# Patient Record
Sex: FEMALE | Race: WHITE | HISPANIC OR LATINO | URBAN - METROPOLITAN AREA
[De-identification: names, ages, dates, MRNs, and addresses within clinical notes are randomized per-mention and may not be internally consistent; named-entity substitution may affect disease eponyms.]

---

## 2017-10-27 ENCOUNTER — LAB VISIT (OUTPATIENT)
Dept: LAB | Facility: HOSPITAL | Age: 47
End: 2017-10-27
Attending: FAMILY MEDICINE
Payer: COMMERCIAL

## 2017-10-27 ENCOUNTER — OFFICE VISIT (OUTPATIENT)
Dept: FAMILY MEDICINE | Facility: CLINIC | Age: 47
End: 2017-10-27
Attending: FAMILY MEDICINE
Payer: COMMERCIAL

## 2017-10-27 ENCOUNTER — PATIENT MESSAGE (OUTPATIENT)
Dept: FAMILY MEDICINE | Facility: CLINIC | Age: 47
End: 2017-10-27

## 2017-10-27 VITALS
DIASTOLIC BLOOD PRESSURE: 80 MMHG | BODY MASS INDEX: 28.02 KG/M2 | SYSTOLIC BLOOD PRESSURE: 120 MMHG | HEART RATE: 71 BPM | HEIGHT: 65 IN | OXYGEN SATURATION: 98 % | WEIGHT: 168.19 LBS | RESPIRATION RATE: 16 BRPM

## 2017-10-27 DIAGNOSIS — R00.2 PALPITATIONS: ICD-10-CM

## 2017-10-27 DIAGNOSIS — Z00.00 ROUTINE GENERAL MEDICAL EXAMINATION AT A HEALTH CARE FACILITY: Primary | ICD-10-CM

## 2017-10-27 DIAGNOSIS — N95.1 SYMPTOMATIC MENOPAUSAL OR FEMALE CLIMACTERIC STATES: ICD-10-CM

## 2017-10-27 DIAGNOSIS — Z98.890 STATUS POST ENDOMETRIAL ABLATION: ICD-10-CM

## 2017-10-27 DIAGNOSIS — Z00.00 LABORATORY EXAM ORDERED AS PART OF ROUTINE GENERAL MEDICAL EXAMINATION: ICD-10-CM

## 2017-10-27 DIAGNOSIS — Z91.89 FRAMINGHAM CARDIAC RISK <10% IN NEXT 10 YEARS: ICD-10-CM

## 2017-10-27 LAB
ALBUMIN SERPL BCP-MCNC: 3.8 G/DL
ALP SERPL-CCNC: 54 U/L
ALT SERPL W/O P-5'-P-CCNC: 25 U/L
ANION GAP SERPL CALC-SCNC: 11 MMOL/L
AST SERPL-CCNC: 27 U/L
BASOPHILS # BLD AUTO: 0.05 K/UL
BASOPHILS NFR BLD: 1 %
BILIRUB SERPL-MCNC: 0.4 MG/DL
BUN SERPL-MCNC: 15 MG/DL
CALCIUM SERPL-MCNC: 10.2 MG/DL
CHLORIDE SERPL-SCNC: 104 MMOL/L
CHOLEST SERPL-MCNC: 215 MG/DL
CHOLEST/HDLC SERPL: 3.7 {RATIO}
CO2 SERPL-SCNC: 26 MMOL/L
CREAT SERPL-MCNC: 0.8 MG/DL
DIFFERENTIAL METHOD: NORMAL
EOSINOPHIL # BLD AUTO: 0.1 K/UL
EOSINOPHIL NFR BLD: 2.6 %
ERYTHROCYTE [DISTWIDTH] IN BLOOD BY AUTOMATED COUNT: 13 %
EST. GFR  (AFRICAN AMERICAN): >60 ML/MIN/1.73 M^2
EST. GFR  (NON AFRICAN AMERICAN): >60 ML/MIN/1.73 M^2
FSH SERPL-ACNC: 76.7 MIU/ML
GLUCOSE SERPL-MCNC: 79 MG/DL
HCT VFR BLD AUTO: 39.4 %
HDLC SERPL-MCNC: 58 MG/DL
HDLC SERPL: 27 %
HGB BLD-MCNC: 12.8 G/DL
IMM GRANULOCYTES # BLD AUTO: 0.01 K/UL
IMM GRANULOCYTES NFR BLD AUTO: 0.2 %
LDLC SERPL CALC-MCNC: 137.4 MG/DL
LH SERPL-ACNC: 28.1 MIU/ML
LYMPHOCYTES # BLD AUTO: 1.6 K/UL
LYMPHOCYTES NFR BLD: 31.5 %
MCH RBC QN AUTO: 28.3 PG
MCHC RBC AUTO-ENTMCNC: 32.5 G/DL
MCV RBC AUTO: 87 FL
MONOCYTES # BLD AUTO: 0.4 K/UL
MONOCYTES NFR BLD: 8.1 %
NEUTROPHILS # BLD AUTO: 2.9 K/UL
NEUTROPHILS NFR BLD: 56.6 %
NONHDLC SERPL-MCNC: 157 MG/DL
NRBC BLD-RTO: 0 /100 WBC
PLATELET # BLD AUTO: 219 K/UL
PMV BLD AUTO: 10.6 FL
POTASSIUM SERPL-SCNC: 4.2 MMOL/L
PROT SERPL-MCNC: 8 G/DL
RBC # BLD AUTO: 4.53 M/UL
SODIUM SERPL-SCNC: 141 MMOL/L
TRIGL SERPL-MCNC: 98 MG/DL
TSH SERPL DL<=0.005 MIU/L-ACNC: 0.92 UIU/ML
WBC # BLD AUTO: 5.08 K/UL

## 2017-10-27 PROCEDURE — 85025 COMPLETE CBC W/AUTO DIFF WBC: CPT

## 2017-10-27 PROCEDURE — 99386 PREV VISIT NEW AGE 40-64: CPT | Mod: 25,S$GLB,, | Performed by: FAMILY MEDICINE

## 2017-10-27 PROCEDURE — 36415 COLL VENOUS BLD VENIPUNCTURE: CPT | Mod: PO

## 2017-10-27 PROCEDURE — 80053 COMPREHEN METABOLIC PANEL: CPT

## 2017-10-27 PROCEDURE — 90471 IMMUNIZATION ADMIN: CPT | Mod: S$GLB,,, | Performed by: FAMILY MEDICINE

## 2017-10-27 PROCEDURE — 90715 TDAP VACCINE 7 YRS/> IM: CPT | Mod: S$GLB,,, | Performed by: FAMILY MEDICINE

## 2017-10-27 PROCEDURE — 83002 ASSAY OF GONADOTROPIN (LH): CPT

## 2017-10-27 PROCEDURE — 84443 ASSAY THYROID STIM HORMONE: CPT

## 2017-10-27 PROCEDURE — 80061 LIPID PANEL: CPT

## 2017-10-27 PROCEDURE — 99999 PR PBB SHADOW E&M-NEW PATIENT-LVL III: CPT | Mod: PBBFAC,,, | Performed by: FAMILY MEDICINE

## 2017-10-27 PROCEDURE — 83001 ASSAY OF GONADOTROPIN (FSH): CPT

## 2017-10-27 RX ORDER — MUPIROCIN 20 MG/G
OINTMENT TOPICAL 2 TIMES DAILY
COMMUNITY
Start: 2017-10-11 | End: 2022-11-09

## 2017-10-27 NOTE — PROGRESS NOTES
Subjective:       Patient ID: Kelly Butterfield is a 47 y.o. female.    Chief Complaint: Annual Exam    HPI     The patient presents to the office today requesting a routine periodic health examination.  This is her first visit with me in many years.  Patient Active Problem List   Diagnosis    Status post endometrial ablation       Past Surgical History:   Procedure Laterality Date    ENDOMETRIAL ABLATION      KNEE ARTHROSCOPY W/ MENISCECTOMY  2017         Current Outpatient Prescriptions:     mupirocin (BACTROBAN) 2 % ointment, Apply topically 2 (two) times daily. As directed, Disp: , Rfl:     Review of patient's allergies indicates:  No Known Allergies    Family History   Problem Relation Age of Onset    Heart disease Mother     Heart disease Father     No Known Problems Brother     No Known Problems Brother        Social History     Social History    Marital status:      Spouse name: N/A    Number of children: 2    Years of education: N/A     Occupational History    Health Care       [employer]     Social History Main Topics    Smoking status: Former Smoker     Packs/day: 1.00     Years: 13.00     Types: Cigarettes     Start date: 10/27/1988     Quit date: 10/27/2001    Smokeless tobacco: Not on file    Alcohol use 0.0 oz/week      Comment: 2-3x/year    Drug use: No    Sexual activity: Yes     Other Topics Concern    Not on file     Social History Narrative    The patient does exercise regularly (jogs/walks, volleyball).      She is not satisfied with weight.    Rates diet as fair.    She does not drink at least 1/2 gallon water daily.    She drinks 1 coffee/tea/caffeine-containing soft drinks daily.    Total sleep time at night is 6-7 hours.    She works 60 hours per week.    She does wear seat belts.    Hobbies include none.           OB History      Para Term  AB Living    3 2 2   1      SAB TAB Ectopic Multiple Live Births    1                Obstetric  Comments    Menarche age 11.   Menses absent since ablation.  First child born age 32.  History of abnormal PAP smear: YES: ASCUS; subsequent PAPs normal.  History of abnormal mammogram: YES: lump, not removed.  History of sexually transmitted disease:  NO            Patient Care Team:  Juventino Wilkins Jr., MD as PCP - General (Family Medicine)  Salinas Romero Jr., MD as Obstetrician (Obstetrics)  Julian Martines MD as Consulting Physician (Orthopedic Surgery)      Review of Systems   Constitutional: Negative for activity change and unexpected weight change.   HENT: Negative for hearing loss, rhinorrhea and trouble swallowing.    Eyes: Negative for discharge and visual disturbance.   Respiratory: Positive for chest tightness. Negative for wheezing.    Cardiovascular: Positive for palpitations. Negative for chest pain.   Gastrointestinal: Negative for blood in stool, constipation, diarrhea and vomiting.        Occ GERD, 2/t certain foods   Endocrine: Negative for polydipsia and polyuria.   Genitourinary: Negative for difficulty urinating, dysuria, hematuria and menstrual problem.   Musculoskeletal: Negative for arthralgias, joint swelling and neck pain.   Neurological: Negative for weakness and headaches.   Psychiatric/Behavioral: Negative for confusion, dysphoric mood and sleep disturbance.       Objective:      Physical Exam      Assessment:       1. Routine general medical examination at a health care facility    2. Palpitations    3. Status post endometrial ablation    4. Laboratory exam ordered as part of routine general medical examination          Plan:       Laboratory investigation, including diabetes and thyroid screening, serum chemistries, and lipid profile.  Discussed with patient the importance of lifestyle modifications, including well-balanced diet and moderate exercise regimen, in reducing risk for cardiovascular/cerebrovascular disease and diabetes.  Discussed routine examinations and  "screenings.   Will obtain records from Dr. Romero. Update Health Maintenance at that time.   Flu/TdaP today.  We will call the patient with results & make further recommendations at that time.        "This note will not be shared with the patient."  "

## 2017-10-27 NOTE — PATIENT INSTRUCTIONS
Your test results will be communicated to you via : My [facility], Telephone or Letter.   If you have not received your test results in one week, please contact the clinic at [phone].

## 2018-06-25 DIAGNOSIS — Z12.39 BREAST CANCER SCREENING: ICD-10-CM

## 2018-08-03 DIAGNOSIS — Z12.11 COLON CANCER SCREENING: ICD-10-CM

## 2018-12-21 ENCOUNTER — LAB VISIT (OUTPATIENT)
Dept: LAB | Facility: HOSPITAL | Age: 48
End: 2018-12-21
Attending: FAMILY MEDICINE
Payer: COMMERCIAL

## 2018-12-21 DIAGNOSIS — Z12.11 COLON CANCER SCREENING: ICD-10-CM

## 2018-12-21 LAB — HEMOCCULT STL QL IA: NEGATIVE

## 2018-12-21 PROCEDURE — 82274 ASSAY TEST FOR BLOOD FECAL: CPT

## 2018-12-31 ENCOUNTER — PATIENT OUTREACH (OUTPATIENT)
Dept: ADMINISTRATIVE | Facility: HOSPITAL | Age: 48
End: 2018-12-31

## 2018-12-31 ENCOUNTER — PATIENT MESSAGE (OUTPATIENT)
Dept: ADMINISTRATIVE | Facility: HOSPITAL | Age: 48
End: 2018-12-31

## 2019-01-30 ENCOUNTER — PATIENT OUTREACH (OUTPATIENT)
Dept: ADMINISTRATIVE | Facility: HOSPITAL | Age: 49
End: 2019-01-30

## 2019-01-30 NOTE — PROGRESS NOTES
SPOKE WITH PATIENT REGARDING SCHEDULING OF PAP SMEAR AND MAMMOGRAM    PATIENT HAS NOT COMPLETED AT AN OUTSIDE FACILITY    DECLINE TO SCHEDULE DURING CALL WILL CALL TO SCHEDULE OR SCHEDULE THROUGH PORTAL    UNDERSTANDING VERBALIZED.

## 2019-04-26 ENCOUNTER — PATIENT MESSAGE (OUTPATIENT)
Dept: ADMINISTRATIVE | Facility: HOSPITAL | Age: 49
End: 2019-04-26

## 2019-04-30 ENCOUNTER — PATIENT MESSAGE (OUTPATIENT)
Dept: FAMILY MEDICINE | Facility: CLINIC | Age: 49
End: 2019-04-30

## 2019-05-01 ENCOUNTER — PATIENT MESSAGE (OUTPATIENT)
Dept: FAMILY MEDICINE | Facility: CLINIC | Age: 49
End: 2019-05-01

## 2020-03-02 ENCOUNTER — PATIENT MESSAGE (OUTPATIENT)
Dept: FAMILY MEDICINE | Facility: CLINIC | Age: 50
End: 2020-03-02

## 2020-03-02 DIAGNOSIS — Z12.11 COLON CANCER SCREENING: Primary | ICD-10-CM

## 2020-03-03 ENCOUNTER — PATIENT MESSAGE (OUTPATIENT)
Dept: FAMILY MEDICINE | Facility: CLINIC | Age: 50
End: 2020-03-03

## 2020-03-03 DIAGNOSIS — Z12.11 COLON CANCER SCREENING: Primary | ICD-10-CM

## 2020-06-16 ENCOUNTER — PATIENT MESSAGE (OUTPATIENT)
Dept: FAMILY MEDICINE | Facility: CLINIC | Age: 50
End: 2020-06-16

## 2020-06-22 ENCOUNTER — TELEPHONE (OUTPATIENT)
Dept: GASTROENTEROLOGY | Facility: CLINIC | Age: 50
End: 2020-06-22

## 2020-06-22 ENCOUNTER — PATIENT MESSAGE (OUTPATIENT)
Dept: GASTROENTEROLOGY | Facility: CLINIC | Age: 50
End: 2020-06-22

## 2020-08-10 DIAGNOSIS — Z01.812 PRE-PROCEDURE LAB EXAM: ICD-10-CM

## 2020-08-14 ENCOUNTER — LAB VISIT (OUTPATIENT)
Dept: FAMILY MEDICINE | Facility: CLINIC | Age: 50
End: 2020-08-14
Payer: COMMERCIAL

## 2020-08-14 ENCOUNTER — TELEPHONE (OUTPATIENT)
Dept: SURGERY | Facility: HOSPITAL | Age: 50
End: 2020-08-14

## 2020-08-14 ENCOUNTER — TELEPHONE (OUTPATIENT)
Dept: GASTROENTEROLOGY | Facility: CLINIC | Age: 50
End: 2020-08-14

## 2020-08-14 DIAGNOSIS — Z01.812 PRE-PROCEDURE LAB EXAM: ICD-10-CM

## 2020-08-14 PROCEDURE — U0003 INFECTIOUS AGENT DETECTION BY NUCLEIC ACID (DNA OR RNA); SEVERE ACUTE RESPIRATORY SYNDROME CORONAVIRUS 2 (SARS-COV-2) (CORONAVIRUS DISEASE [COVID-19]), AMPLIFIED PROBE TECHNIQUE, MAKING USE OF HIGH THROUGHPUT TECHNOLOGIES AS DESCRIBED BY CMS-2020-01-R: HCPCS

## 2020-08-14 RX ORDER — PROGESTERONE 200 MG/1
200 CAPSULE ORAL NIGHTLY
COMMUNITY
End: 2022-11-09

## 2020-08-14 NOTE — TELEPHONE ENCOUNTER
Spoke with pt. Informed [facility] is making calls to give arrival time & the only patients who are given early times guaranteed are diabetics. Pt verbalized understanding.

## 2020-08-14 NOTE — TELEPHONE ENCOUNTER
Called patient for preop call today and was not happy with procedure time. All other patients for the day have been contacted. Unable to adjust procedure time. Patient wants to reschedule and be scheduled as first or second case. Please contact patient to reschedule.

## 2020-08-14 NOTE — TELEPHONE ENCOUNTER
----- Message from Kendy Palacios sent at 8/14/2020 12:34 PM CDT -----  Regarding: reschedule appt  Contact: pt [phone]  Pt is calling to reschedule her procedure to a earlier time. Please contact pt [phone]

## 2020-08-15 LAB — SARS-COV-2 RNA RESP QL NAA+PROBE: NOT DETECTED

## 2020-08-17 ENCOUNTER — ANESTHESIA EVENT (OUTPATIENT)
Dept: ENDOSCOPY | Facility: HOSPITAL | Age: 50
End: 2020-08-17
Payer: COMMERCIAL

## 2020-08-17 ENCOUNTER — ANESTHESIA (OUTPATIENT)
Dept: ENDOSCOPY | Facility: HOSPITAL | Age: 50
End: 2020-08-17
Payer: COMMERCIAL

## 2020-08-17 ENCOUNTER — HOSPITAL ENCOUNTER (OUTPATIENT)
Facility: HOSPITAL | Age: 50
Discharge: HOME | End: 2020-08-17
Attending: INTERNAL MEDICINE | Admitting: INTERNAL MEDICINE
Payer: COMMERCIAL

## 2020-08-17 VITALS
TEMPERATURE: 98 F | WEIGHT: 160 LBS | SYSTOLIC BLOOD PRESSURE: 120 MMHG | OXYGEN SATURATION: 98 % | BODY MASS INDEX: 26.66 KG/M2 | HEIGHT: 65 IN | RESPIRATION RATE: 16 BRPM | DIASTOLIC BLOOD PRESSURE: 69 MMHG | HEART RATE: 76 BPM

## 2020-08-17 DIAGNOSIS — Z12.11 SCREEN FOR COLON CANCER: ICD-10-CM

## 2020-08-17 PROCEDURE — G0121 COLON CA SCRN NOT HI RSK IND: HCPCS | Mod: PO | Performed by: INTERNAL MEDICINE

## 2020-08-17 PROCEDURE — D9220A PRA ANESTHESIA: ICD-10-PCS | Mod: CRNA,,, | Performed by: NURSE ANESTHETIST, CERTIFIED REGISTERED

## 2020-08-17 PROCEDURE — D9220A PRA ANESTHESIA: ICD-10-PCS | Mod: ANES,,, | Performed by: ANESTHESIOLOGY

## 2020-08-17 PROCEDURE — 63600175 PHARM REV CODE 636 W HCPCS: Mod: PO | Performed by: INTERNAL MEDICINE

## 2020-08-17 PROCEDURE — 37000009 HC ANESTHESIA EA ADD 15 MINS: Mod: PO | Performed by: INTERNAL MEDICINE

## 2020-08-17 PROCEDURE — 25000003 PHARM REV CODE 250: Mod: PO | Performed by: NURSE ANESTHETIST, CERTIFIED REGISTERED

## 2020-08-17 PROCEDURE — G0121 COLON CA SCRN NOT HI RSK IND: ICD-10-PCS | Mod: ,,, | Performed by: INTERNAL MEDICINE

## 2020-08-17 PROCEDURE — 63600175 PHARM REV CODE 636 W HCPCS: Mod: PO | Performed by: NURSE ANESTHETIST, CERTIFIED REGISTERED

## 2020-08-17 PROCEDURE — G0121 COLON CA SCRN NOT HI RSK IND: HCPCS | Mod: ,,, | Performed by: INTERNAL MEDICINE

## 2020-08-17 PROCEDURE — D9220A PRA ANESTHESIA: Mod: ANES,,, | Performed by: ANESTHESIOLOGY

## 2020-08-17 PROCEDURE — 37000008 HC ANESTHESIA 1ST 15 MINUTES: Mod: PO | Performed by: INTERNAL MEDICINE

## 2020-08-17 PROCEDURE — D9220A PRA ANESTHESIA: Mod: CRNA,,, | Performed by: NURSE ANESTHETIST, CERTIFIED REGISTERED

## 2020-08-17 RX ORDER — PROPOFOL 10 MG/ML
VIAL (ML) INTRAVENOUS
Status: DISCONTINUED | OUTPATIENT
Start: 2020-08-17 | End: 2020-08-17

## 2020-08-17 RX ORDER — SODIUM CHLORIDE 0.9 % (FLUSH) 0.9 %
10 SYRINGE (ML) INJECTION
Status: DISCONTINUED | OUTPATIENT
Start: 2020-08-17 | End: 2020-08-17 | Stop reason: HOSPADM

## 2020-08-17 RX ORDER — SODIUM CHLORIDE, SODIUM LACTATE, POTASSIUM CHLORIDE, CALCIUM CHLORIDE 600; 310; 30; 20 MG/100ML; MG/100ML; MG/100ML; MG/100ML
INJECTION, SOLUTION INTRAVENOUS CONTINUOUS
Status: DISCONTINUED | OUTPATIENT
Start: 2020-08-17 | End: 2020-08-17 | Stop reason: HOSPADM

## 2020-08-17 RX ORDER — LIDOCAINE HCL/PF 100 MG/5ML
SYRINGE (ML) INTRAVENOUS
Status: DISCONTINUED | OUTPATIENT
Start: 2020-08-17 | End: 2020-08-17

## 2020-08-17 RX ADMIN — PROPOFOL 30 MG: 10 INJECTION, EMULSION INTRAVENOUS at 07:08

## 2020-08-17 RX ADMIN — LIDOCAINE HYDROCHLORIDE 100 MG: 20 INJECTION, SOLUTION INTRAVENOUS at 07:08

## 2020-08-17 RX ADMIN — SODIUM CHLORIDE, SODIUM LACTATE, POTASSIUM CHLORIDE, AND CALCIUM CHLORIDE: .6; .31; .03; .02 INJECTION, SOLUTION INTRAVENOUS at 07:08

## 2020-08-17 NOTE — DISCHARGE SUMMARY
Discharge Note  Short Stay      SUMMARY     Admit Date: 8/17/2020    Attending Physician: Yannick Min MD     Discharge Physician: Yannick Min MD    Discharge Date: 8/17/2020 7:55 AM    Final Diagnosis: Colon cancer screening [Z12.11]    Disposition: HOME OR SELF CARE    Patient Instructions:   Current Discharge Medication List      CONTINUE these medications which have NOT CHANGED    Details   progesterone (PROMETRIUM) 200 MG capsule Take 200 mg by mouth every evening.      mupirocin (BACTROBAN) 2 % ointment Apply topically 2 (two) times daily. As directed             Discharge Procedure Orders (must include Diet, Follow-up, Activity)    Follow Up:  Follow up with PCP as previously scheduled  Resume routine diet.  Activity as tolerated.    No driving day of procedure.

## 2020-08-17 NOTE — ANESTHESIA POSTPROCEDURE EVALUATION
Anesthesia Post Evaluation    Patient: Kelly Butterfield    Procedure(s) Performed: Procedure(s) (LRB):  COLONOSCOPY (N/A)    Final Anesthesia Type: general    Patient location during evaluation: PACU  Patient participation: Yes- Able to Participate  Level of consciousness: awake and alert and oriented  Post-procedure vital signs: reviewed and stable  Pain management: adequate  Airway patency: patent    PONV status at discharge: No PONV  Anesthetic complications: no      Cardiovascular status: blood pressure returned to baseline  Respiratory status: unassisted, spontaneous ventilation and room air  Hydration status: euvolemic  Follow-up not needed.          Vitals Value Taken Time   /69 08/17/20 0824   Temp 36.4 °C (97.5 °F) 08/17/20 0824   Pulse 76 08/17/20 0824   Resp 16 08/17/20 0824   SpO2 98 % 08/17/20 0824         No case tracking events are documented in the log.      Pain/Braden Score: Braden Score: 10 (8/17/2020  8:15 AM)

## 2020-08-17 NOTE — ANESTHESIA PREPROCEDURE EVALUATION
08/17/2020  Kelly Butterfield is a 50 y.o., female.    Anesthesia Evaluation          Review of Systems  Anesthesia Hx:  Hx of Anesthetic complications PONV   Social:  Non-Smoker   Cardiovascular:   Exercise tolerance: good    Pulmonary:  Pulmonary Normal    Renal/:  Renal/ Normal     Hepatic/GI:   Bowel Prep.    Neurological:   Seizures, well controlled Last seizure 40years ago   Endocrine:  Endocrine Normal    Psych:  Psychiatric Normal           Physical Exam  General:  Well nourished    Airway/Jaw/Neck:  Airway Findings: Mouth Opening: Normal Tongue: Normal  General Airway Assessment: Adult  Mallampati: I  TM Distance: Normal, at least 6 cm       Chest/Lungs:  Chest/Lungs Clear    Heart/Vascular:  Heart Findings: Normal            Anesthesia Plan  Type of Anesthesia, risks & benefits discussed:  Anesthesia Type:  general  Patient's Preference:   Intra-op Monitoring Plan: standard ASA monitors  Intra-op Monitoring Plan Comments:   Post Op Pain Control Plan: multimodal analgesia and IV/PO Opioids PRN  Post Op Pain Control Plan Comments:   Induction:   IV  Beta Blocker:  Patient is not currently on a Beta-Blocker (No further documentation required).       Informed Consent: Patient understands risks and agrees with Anesthesia plan.  Questions answered. Anesthesia consent signed with patient.  ASA Score: 1     Day of Surgery Review of History & Physical:    H&P update referred to the surgeon.     Anesthesia Plan Notes: I have personally evaluated the patient and discussed risk/benefits/alternatives of general anesthesia.        Ready For Surgery From Anesthesia Perspective.

## 2020-08-17 NOTE — [employer] PATIENT INSTRUCTIONS
Discharge Summary/Instructions after an Endoscopic Procedure  Patient Name: Kelyl Butterfield  Patient MRN: [id number]  Patient YOB: 1970 Monday, August 17, 2020  Yannick Min MD  RESTRICTIONS:  During your procedure today, you received medications for sedation.  These   medications may affect your judgment, balance and coordination.  Therefore,   for 24 hours, you have the following restrictions:   - DO NOT drive a car, operate machinery, make legal/financial decisions,   sign important papers or drink alcohol.    ACTIVITY:  Today: no heavy lifting, straining or running due to procedural   sedation/anesthesia.  The following day: return to full activity including work.  DIET:  Eat and drink normally unless instructed otherwise.     TREATMENT FOR COMMON SIDE EFFECTS:  - Mild abdominal pain, nausea, belching, bloating or excessive gas:  rest,   eat lightly and use a heating pad.  - Sore Throat: treat with throat lozenges and/or gargle with warm salt   water.  - Because air was used during the procedure, expelling large amounts of air   from your rectum or belching is normal.  - If a bowel prep was taken, you may not have a bowel movement for 1-3 days.    This is normal.  SYMPTOMS TO WATCH FOR AND REPORT TO YOUR PHYSICIAN:  1. Abdominal pain or bloating, other than gas cramps.  2. Chest pain.  3. Back pain.  4. Signs of infection such as: chills or fever occurring within 24 hours   after the procedure.  5. Rectal bleeding, which would show as bright red, maroon, or black stools.   (A tablespoon of blood from the rectum is not serious, especially if   hemorrhoids are present.)  6. Vomiting.  7. Weakness or dizziness.  GO DIRECTLY TO THE NEAREST EMERGENCY ROOM IF YOU HAVE ANY OF THE FOLLOWING:      Difficulty breathing              Chills and/or fever over 101 F   Persistent vomiting and/or vomiting blood   Severe abdominal pain   Severe chest pain   Black, tarry stools   Bleeding- more than one  tablespoon   Any other symptom or condition that you feel may need urgent attention  Your doctor recommends these additional instructions:  If any biopsies were taken, your doctors clinic will contact you in 1 to 2   weeks with any results.  Your physician has recommended a repeat colonoscopy in 10 years for   screening purposes.   You are being discharged to home.  For questions, problems or results please call your physician - Yannick Min MD at Work:  [phone].  EMERGENCY PHONE NUMBER: [phone], LAB RESULTS: [phone]  IF A COMPLICATION OR EMERGENCY SITUATION ARISES AND YOU ARE UNABLE TO REACH   YOUR PHYSICIAN - GO DIRECTLY TO THE EMERGENCY ROOM.  ___________________________________________  Nurse Signature  ___________________________________________  Patient/Designated Responsible Party Signature  Yannick Min MD  8/17/2020 7:54:14 AM  This report has been verified and signed electronically.  [employer]

## 2020-08-17 NOTE — TRANSFER OF CARE
"Anesthesia Transfer of Care Note    Patient: Kelly Butterfield    Procedure(s) Performed: Procedure(s) (LRB):  COLONOSCOPY (N/A)    Patient location: PACU    Anesthesia Type: general    Transport from OR: Transported from OR on room air with adequate spontaneous ventilation    Post pain: adequate analgesia    Post assessment: no apparent anesthetic complications and tolerated procedure well    Post vital signs: stable    Level of consciousness: awake and alert    Nausea/Vomiting: no nausea/vomiting    Complications: none    Transfer of care protocol was followed      Last vitals:   Visit Vitals  /75   Pulse 86   Temp 36.4 °C (97.5 °F) (Skin)   Resp 17   Ht 5' 5" (1.651 m)   Wt 72.6 kg (160 lb)   SpO2 100%   Breastfeeding No   BMI 26.63 kg/m²     "

## 2020-08-17 NOTE — H&P
History & Physical - Short Stay  Gastroenterology      SUBJECTIVE:     Procedure: Colonoscopy    Chief Complaint/Indication for Procedure: Screening    PTA Medications   Medication Sig    progesterone (PROMETRIUM) 200 MG capsule Take 200 mg by mouth every evening.    mupirocin (BACTROBAN) 2 % ointment Apply topically 2 (two) times daily. As directed       Review of patient's allergies indicates:  No Known Allergies     Past Medical History:   Diagnosis Date    PONV (postoperative nausea and vomiting)     Seizures      Past Surgical History:   Procedure Laterality Date    AUGMENTATION OF BREAST Bilateral 1998    BREAST CYST ASPIRATION Right     > 15 yrs ago    ENDOMETRIAL ABLATION  2006    KNEE ARTHROSCOPY W/ MENISCECTOMY Right 2017     Family History   Problem Relation Age of Onset    Heart disease Mother     Heart disease Father     No Known Problems Brother     No Known Problems Brother     Cancer Paternal Grandmother      Social History     Tobacco Use    Smoking status: Former Smoker     Packs/day: 1.00     Years: 13.00     Pack years: 13.00     Types: Cigarettes     Start date: 10/27/1988     Quit date: 10/27/2001     Years since quittin.8    Smokeless tobacco: Never Used   Substance Use Topics    Alcohol use: Yes     Alcohol/week: 0.0 standard drinks     Comment: 2-3x/year    Drug use: No         OBJECTIVE:     Vital Signs (Most Recent)  Temp: 97.5 °F (36.4 °C) (20 07)  Pulse: 86 (20 07)  Resp: 17 (20 07)  BP: 122/75 (20 0704)  SpO2: 100 % (20 07)    Physical Exam                                                        GENERAL:  Comfortable, in no acute distress.                                 HEENT EXAM:  Nonicteric.  No adenopathy.  Oropharynx is clear.               NECK:  Supple.                                                               LUNGS:  Clear.                                                               CARDIAC:  Regular rate  and rhythm.  S1, S2.  No murmur.                      ABDOMEN:  Soft, positive bowel sounds, nontender.  No hepatosplenomegaly or masses.  No rebound or guarding.                                             EXTREMITIES:  No edema.     MENTAL STATUS:  Normal, alert and oriented.      ASSESSMENT/PLAN:     Assessment: Colorectal cancer screening    Plan: Colonoscopy    Anesthesia Plan: General    ASA Grade: ASA 2 - Patient with mild systemic disease with no functional limitations    MALLAMPATI SCORE:  I (soft palate, uvula, fauces, and tonsillar pillars visible)     In my medical opinion and judgment, this medical or surgical procedure was not able to be safely postponed in accordance with [place] Department of Health, Healthcare Facility Notice #[id number]-007.

## 2020-08-17 NOTE — DISCHARGE INSTRUCTIONS

## 2020-10-05 ENCOUNTER — PATIENT MESSAGE (OUTPATIENT)
Dept: INTERNAL MEDICINE | Facility: CLINIC | Age: 50
End: 2020-10-05

## 2020-10-08 ENCOUNTER — PATIENT OUTREACH (OUTPATIENT)
Dept: INTERNAL MEDICINE | Facility: CLINIC | Age: 50
End: 2020-10-08

## 2020-10-08 DIAGNOSIS — Z11.59 NEED FOR HEPATITIS C SCREENING TEST: Primary | ICD-10-CM

## 2020-10-16 ENCOUNTER — OFFICE VISIT (OUTPATIENT)
Dept: OBSTETRICS AND GYNECOLOGY | Facility: CLINIC | Age: 50
End: 2020-10-16
Payer: COMMERCIAL

## 2020-10-16 VITALS
SYSTOLIC BLOOD PRESSURE: 114 MMHG | RESPIRATION RATE: 18 BRPM | HEIGHT: 65 IN | BODY MASS INDEX: 28.5 KG/M2 | DIASTOLIC BLOOD PRESSURE: 78 MMHG | WEIGHT: 171.06 LBS

## 2020-10-16 DIAGNOSIS — Z79.890 HORMONE REPLACEMENT THERAPY (HRT): Primary | ICD-10-CM

## 2020-10-16 PROCEDURE — 99999 PR PBB SHADOW E&M-EST. PATIENT-LVL III: CPT | Mod: PBBFAC,,, | Performed by: SPECIALIST

## 2020-10-16 PROCEDURE — 3008F PR BODY MASS INDEX (BMI) DOCUMENTED: ICD-10-PCS | Mod: CPTII,S$GLB,, | Performed by: SPECIALIST

## 2020-10-16 PROCEDURE — 99203 OFFICE O/P NEW LOW 30 MIN: CPT | Mod: S$GLB,,, | Performed by: SPECIALIST

## 2020-10-16 PROCEDURE — 3008F BODY MASS INDEX DOCD: CPT | Mod: CPTII,S$GLB,, | Performed by: SPECIALIST

## 2020-10-16 PROCEDURE — 99999 PR PBB SHADOW E&M-EST. PATIENT-LVL III: ICD-10-PCS | Mod: PBBFAC,,, | Performed by: SPECIALIST

## 2020-10-16 PROCEDURE — 99203 PR OFFICE/OUTPT VISIT, NEW, LEVL III, 30-44 MIN: ICD-10-PCS | Mod: S$GLB,,, | Performed by: SPECIALIST

## 2020-10-16 RX ORDER — GUAIFENESIN/PHENYLPROPANOLAMIN
500 EXPECTORANT ORAL DAILY
COMMUNITY

## 2020-10-16 RX ORDER — NICOTINE POLACRILEX 2 MG
GUM BUCCAL
COMMUNITY

## 2020-10-16 NOTE — PROGRESS NOTES
49 yo WF followed by LMP for HRT. Pt currently taking Bioidentical HRT consiting or estradiol, test and progestin.  I discussed HRT and associated risks and benefits  Pt does complain terminal hair growth, complexion changes and hair loss.  I discussed genetic component as well as likley testtosterone effects  I discussed the indications for testosterone therapy are limited and subjective and discussed supportive measures such as Spironolactone and Rogain for women  I did discuss dangers of HRT , primarily tetst. And recommend cessation or testosterone therapy  I answered all questions pt posed  Past Medical History:   Diagnosis Date    PONV (postoperative nausea and vomiting)     Seizures        Past Surgical History:   Procedure Laterality Date    AUGMENTATION OF BREAST Bilateral 07/1998    BREAST CYST ASPIRATION Right     > 15 yrs ago    COLONOSCOPY N/A 8/17/2020    Procedure: COLONOSCOPY;  Surgeon: Yannick Min MD;  Location: [facility];  Service: Endoscopy;  Laterality: N/A;    ENDOMETRIAL ABLATION  2006    KNEE ARTHROSCOPY W/ MENISCECTOMY Right 03/17/2017       Family History   Problem Relation Age of Onset    Heart disease Mother     Heart disease Father     No Known Problems Brother     No Known Problems Brother     Cancer Paternal Grandmother        Social History     Socioeconomic History    Marital status:      Spouse name: Not on file    Number of children: 2    Years of education: Not on file    Highest education level: Not on file   Occupational History    Occupation: Health Care      Comment: [employer]   Social Needs    Financial resource strain: Not on file    Food insecurity     Worry: Not on file     Inability: Not on file    Transportation needs     Medical: Not on file     Non-medical: Not on file   Tobacco Use    Smoking status: Former Smoker     Packs/day: 1.00     Years: 13.00     Pack years: 13.00     Types: Cigarettes     Start date: 10/27/1988      Quit date: 10/27/2001     Years since quittin.9    Smokeless tobacco: Never Used   Substance and Sexual Activity    Alcohol use: Yes     Alcohol/week: 0.0 standard drinks     Comment: 2-3x/year    Drug use: No    Sexual activity: Yes   Lifestyle    Physical activity     Days per week: Not on file     Minutes per session: Not on file    Stress: Not on file   Relationships    Social connections     Talks on phone: Not on file     Gets together: Not on file     Attends Religion service: Not on file     Active member of club or organization: Not on file     Attends meetings of clubs or organizations: Not on file     Relationship status: Not on file   Other Topics Concern    Not on file   Social History Narrative    The patient does exercise regularly (jogs/walks, volleyball).      She is not satisfied with weight.    Rates diet as fair.    She does not drink at least 1/2 gallon water daily.    She drinks 1 coffee/tea/caffeine-containing soft drinks daily.    Total sleep time at night is 6-7 hours.    She works 60 hours per week.    She does wear seat belts.    Hobbies include none.       Current Outpatient Medications   Medication Sig Dispense Refill    biotin 1 mg Cap Take by mouth.      saw palmetto 500 MG capsule Take 500 mg by mouth once daily.      mupirocin (BACTROBAN) 2 % ointment Apply topically 2 (two) times daily. As directed      progesterone (PROMETRIUM) 200 MG capsule Take 200 mg by mouth every evening.       No current facility-administered medications for this visit.        Review of patient's allergies indicates:  No Known Allergies    Review of System:   General: MENOPAUSE S/S TERMINAL HAIR GROWTH HAIR LOSS  Psychological: no depression or suicidal ideation  Breasts: no new or changing breast lumps, nipple discharge or masses.  Respiratory: no cough, shortness of breath, or wheezing  Cardiovascular: no chest pain or dyspnea on exertion  Gastrointestinal: no abdominal pain, change in  bowel habits, or black or bloody stools  Genito-Urinary: no incontinence, urinary frequency/urgency or vulvar/vaginal symptoms, pelvic pain or abnormal vaginal bleeding.  Musculoskeletal: no gait disturbance or muscular weakness      Pt will return to LMD  RTO prn  I reviewed pt's past medical history, past and current meds, family history, allergies and reviewed problem list  I spent 20 min with pt with approx half in consultation

## 2022-11-09 ENCOUNTER — OFFICE VISIT (OUTPATIENT)
Dept: FAMILY MEDICINE | Facility: CLINIC | Age: 52
End: 2022-11-09
Payer: COMMERCIAL

## 2022-11-09 VITALS
SYSTOLIC BLOOD PRESSURE: 116 MMHG | BODY MASS INDEX: 25.42 KG/M2 | HEIGHT: 65 IN | DIASTOLIC BLOOD PRESSURE: 70 MMHG | WEIGHT: 152.56 LBS

## 2022-11-09 DIAGNOSIS — Z00.00 WELLNESS EXAMINATION: Primary | ICD-10-CM

## 2022-11-09 DIAGNOSIS — Z01.89 ENCOUNTER FOR ROUTINE LABORATORY TESTING: ICD-10-CM

## 2022-11-09 DIAGNOSIS — Z11.59 NEED FOR HEPATITIS C SCREENING TEST: ICD-10-CM

## 2022-11-09 DIAGNOSIS — Z13.820 ENCOUNTER FOR SCREENING FOR OSTEOPOROSIS: ICD-10-CM

## 2022-11-09 DIAGNOSIS — K57.90 DIVERTICULOSIS: ICD-10-CM

## 2022-11-09 DIAGNOSIS — Z12.31 VISIT FOR SCREENING MAMMOGRAM: ICD-10-CM

## 2022-11-09 DIAGNOSIS — Z78.0 MENOPAUSE: ICD-10-CM

## 2022-11-09 PROCEDURE — 3074F PR MOST RECENT SYSTOLIC BLOOD PRESSURE < 130 MM HG: ICD-10-PCS | Mod: CPTII,S$GLB,, | Performed by: INTERNAL MEDICINE

## 2022-11-09 PROCEDURE — 3078F DIAST BP <80 MM HG: CPT | Mod: CPTII,S$GLB,, | Performed by: INTERNAL MEDICINE

## 2022-11-09 PROCEDURE — 1159F MED LIST DOCD IN RCRD: CPT | Mod: CPTII,S$GLB,, | Performed by: INTERNAL MEDICINE

## 2022-11-09 PROCEDURE — 3074F SYST BP LT 130 MM HG: CPT | Mod: CPTII,S$GLB,, | Performed by: INTERNAL MEDICINE

## 2022-11-09 PROCEDURE — 3008F PR BODY MASS INDEX (BMI) DOCUMENTED: ICD-10-PCS | Mod: CPTII,S$GLB,, | Performed by: INTERNAL MEDICINE

## 2022-11-09 PROCEDURE — 3008F BODY MASS INDEX DOCD: CPT | Mod: CPTII,S$GLB,, | Performed by: INTERNAL MEDICINE

## 2022-11-09 PROCEDURE — 99386 PR PREVENTIVE VISIT,NEW,40-64: ICD-10-PCS | Mod: S$GLB,,, | Performed by: INTERNAL MEDICINE

## 2022-11-09 PROCEDURE — 1160F RVW MEDS BY RX/DR IN RCRD: CPT | Mod: CPTII,S$GLB,, | Performed by: INTERNAL MEDICINE

## 2022-11-09 PROCEDURE — 1160F PR REVIEW ALL MEDS BY PRESCRIBER/CLIN PHARMACIST DOCUMENTED: ICD-10-PCS | Mod: CPTII,S$GLB,, | Performed by: INTERNAL MEDICINE

## 2022-11-09 PROCEDURE — 99999 PR PBB SHADOW E&M-EST. PATIENT-LVL III: CPT | Mod: PBBFAC,,, | Performed by: INTERNAL MEDICINE

## 2022-11-09 PROCEDURE — 3078F PR MOST RECENT DIASTOLIC BLOOD PRESSURE < 80 MM HG: ICD-10-PCS | Mod: CPTII,S$GLB,, | Performed by: INTERNAL MEDICINE

## 2022-11-09 PROCEDURE — 99999 PR PBB SHADOW E&M-EST. PATIENT-LVL III: ICD-10-PCS | Mod: PBBFAC,,, | Performed by: INTERNAL MEDICINE

## 2022-11-09 PROCEDURE — 1159F PR MEDICATION LIST DOCUMENTED IN MEDICAL RECORD: ICD-10-PCS | Mod: CPTII,S$GLB,, | Performed by: INTERNAL MEDICINE

## 2022-11-09 PROCEDURE — 99386 PREV VISIT NEW AGE 40-64: CPT | Mod: S$GLB,,, | Performed by: INTERNAL MEDICINE

## 2022-11-09 RX ORDER — METFORMIN HYDROCHLORIDE 500 MG/1
1000 TABLET, EXTENDED RELEASE ORAL DAILY
COMMUNITY
Start: 2022-07-13 | End: 2023-10-10

## 2022-11-09 NOTE — PROGRESS NOTES
"Subjective:       Patient ID: Kelly Butterfield is a 52 y.o. female.    Chief Complaint: Establish Care  Gyn: Dr Kc -- off HRT and pellets for 1yr   MM2020  Dexa: never   Colonoscopy:  2020  Immunizations: Tdap: 2017 Shingles: recommend Covid: yes   Smoker:      HPI    Wellness -here to establish. Works for [employer] from home   Works out and runner.     Metabolic syndrome: BMI 25 - seeing [facility] Rx Ozempic / Metformin. Helps cut appetite.  Previous Adipex issue sleeping       Review of Systems:  Review of Systems   Constitutional:  Negative for appetite change.   HENT:  Negative for nosebleeds.    Eyes:  Negative for pain.   Respiratory:  Negative for choking.    Cardiovascular:  Negative for chest pain.   Gastrointestinal:  Negative for blood in stool.   Genitourinary:  Negative for hematuria.   Musculoskeletal:  Negative for joint swelling.   Skin:  Negative for pallor.   Neurological:  Negative for facial asymmetry.   Hematological:  Does not bruise/bleed easily.   Psychiatric/Behavioral:  Negative for confusion.      Objective:     Vitals:    22 0954   BP: 116/70   BP Location: Left arm   Patient Position: Sitting   Weight: 69.2 kg (152 lb 8.9 oz)   Height: 5' 5" (1.651 m)          Physical Exam  Vitals reviewed.   Constitutional:       Appearance: Normal appearance.   HENT:      Head: Normocephalic and atraumatic.      Mouth/Throat:      Pharynx: Oropharynx is clear.   Eyes:      Extraocular Movements: Extraocular movements intact.      Conjunctiva/sclera: Conjunctivae normal.      Pupils: Pupils are equal, round, and reactive to light.   Cardiovascular:      Rate and Rhythm: Normal rate and regular rhythm.      Heart sounds: Normal heart sounds.   Pulmonary:      Effort: Pulmonary effort is normal.      Breath sounds: Normal breath sounds.   Abdominal:      General: Bowel sounds are normal.      Palpations: Abdomen is soft.   Musculoskeletal:         General: Normal range of motion.      Cervical " back: Normal range of motion and neck supple.   Skin:     General: Skin is warm and dry.   Neurological:      General: No focal deficit present.      Mental Status: She is alert and oriented to person, place, and time.   Psychiatric:         Mood and Affect: Mood normal.       Medication List with Changes/Refills   Current Medications    BIOTIN 1 MG CAP    Take by mouth.    METFORMIN (GLUCOPHAGE-XR) 500 MG ER 24HR TABLET    Take 1,000 mg by mouth once daily.    SAW PALMETTO 500 MG CAPSULE    Take 500 mg by mouth once daily.    SEMAGLUTIDE (OZEMPIC) 0.25 MG OR 0.5 MG(2 MG/1.5 ML) PEN INJECTOR    0.5 mg. Compounded vial from [facility]   Discontinued Medications    MUPIROCIN (BACTROBAN) 2 % OINTMENT    Apply topically 2 (two) times daily. As directed    PROGESTERONE (PROMETRIUM) 200 MG CAPSULE    Take 200 mg by mouth every evening.       Assessment & Plan:  1. Wellness examination    2. Diverticulosis    3. Menopause  - DXA Bone Density Spine And Hip; Future  - Estradiol; Future  - Follicle Stimulating Hormone; Future  - Progesterone; Future    4. Encounter for screening for osteoporosis  - DXA Bone Density Spine And Hip; Future  - CBC Without Differential; Future  - Comprehensive Metabolic Panel; Future  - Lipid Panel; Future  - TSH; Future  - Hepatitis C Antibody; Future    5. Visit for screening mammogram  - Mammo Digital Screening Bilat w/ Benito; Future    6. Encounter for routine laboratory testing    7. Need for hepatitis C screening test  - Hepatitis C Antibody; Future   Wellness examination    Diverticulosis    Menopause  -     DXA Bone Density Spine And Hip; Future; Expected date: 11/09/2022  -     Estradiol; Future; Expected date: 11/09/2022  -     Follicle Stimulating Hormone; Future; Expected date: 11/09/2022  -     Progesterone; Future; Expected date: 11/09/2022    Encounter for screening for osteoporosis  -     DXA Bone Density Spine And Hip; Future; Expected date: 11/09/2022  -     CBC Without  Differential; Future; Expected date: 11/09/2022  -     Comprehensive Metabolic Panel; Future; Expected date: 11/09/2022  -     Lipid Panel; Future; Expected date: 11/09/2022  -     TSH; Future; Expected date: 11/09/2022  -     Hepatitis C Antibody; Future; Expected date: 11/09/2022    Visit for screening mammogram  -     Mammo Digital Screening Bilat w/ Benito; Future; Expected date: 11/09/2022    Encounter for routine laboratory testing    Need for hepatitis C screening test  -     Hepatitis C Antibody; Future; Expected date: 11/09/2022      Continue to work on regular exercise, maintain healthy weight, balanced diet. Avoid unhealthy habits: smoking, excessive alcohol intake.

## 2022-11-21 ENCOUNTER — LAB VISIT (OUTPATIENT)
Dept: LAB | Facility: HOSPITAL | Age: 52
End: 2022-11-21
Attending: INTERNAL MEDICINE
Payer: COMMERCIAL

## 2022-11-21 DIAGNOSIS — Z13.820 ENCOUNTER FOR SCREENING FOR OSTEOPOROSIS: ICD-10-CM

## 2022-11-21 DIAGNOSIS — Z78.0 MENOPAUSE: ICD-10-CM

## 2022-11-21 DIAGNOSIS — Z11.59 NEED FOR HEPATITIS C SCREENING TEST: ICD-10-CM

## 2022-11-21 LAB
ALBUMIN SERPL BCP-MCNC: 4.4 G/DL (ref 3.5–5.2)
ALP SERPL-CCNC: 35 U/L (ref 55–135)
ALT SERPL W/O P-5'-P-CCNC: 11 U/L (ref 10–44)
ANION GAP SERPL CALC-SCNC: 10 MMOL/L (ref 8–16)
AST SERPL-CCNC: 22 U/L (ref 10–40)
BILIRUB SERPL-MCNC: 0.7 MG/DL (ref 0.1–1)
BUN SERPL-MCNC: 15 MG/DL (ref 6–20)
CALCIUM SERPL-MCNC: 9.9 MG/DL (ref 8.7–10.5)
CHLORIDE SERPL-SCNC: 103 MMOL/L (ref 95–110)
CHOLEST SERPL-MCNC: 206 MG/DL (ref 120–199)
CHOLEST/HDLC SERPL: 4.3 {RATIO} (ref 2–5)
CO2 SERPL-SCNC: 26 MMOL/L (ref 23–29)
CREAT SERPL-MCNC: 0.9 MG/DL (ref 0.5–1.4)
EST. GFR  (NO RACE VARIABLE): >60 ML/MIN/1.73 M^2
ESTRADIOL SERPL-MCNC: 15 PG/ML
FSH SERPL-ACNC: 92.57 MIU/ML
GLUCOSE SERPL-MCNC: 78 MG/DL (ref 70–110)
HCV AB SERPL QL IA: NORMAL
HDLC SERPL-MCNC: 48 MG/DL (ref 40–75)
HDLC SERPL: 23.3 % (ref 20–50)
LDLC SERPL CALC-MCNC: 143.6 MG/DL (ref 63–159)
NONHDLC SERPL-MCNC: 158 MG/DL
POTASSIUM SERPL-SCNC: 4.2 MMOL/L (ref 3.5–5.1)
PROGEST SERPL-MCNC: 0.1 NG/ML
PROT SERPL-MCNC: 7.7 G/DL (ref 6–8.4)
SODIUM SERPL-SCNC: 139 MMOL/L (ref 136–145)
TRIGL SERPL-MCNC: 72 MG/DL (ref 30–150)
TSH SERPL DL<=0.005 MIU/L-ACNC: 0.62 UIU/ML (ref 0.4–4)

## 2022-11-21 PROCEDURE — 82670 ASSAY OF TOTAL ESTRADIOL: CPT | Performed by: INTERNAL MEDICINE

## 2022-11-21 PROCEDURE — 84144 ASSAY OF PROGESTERONE: CPT | Performed by: INTERNAL MEDICINE

## 2022-11-21 PROCEDURE — 36415 COLL VENOUS BLD VENIPUNCTURE: CPT | Mod: PN | Performed by: INTERNAL MEDICINE

## 2022-11-21 PROCEDURE — 84443 ASSAY THYROID STIM HORMONE: CPT | Performed by: INTERNAL MEDICINE

## 2022-11-21 PROCEDURE — 83001 ASSAY OF GONADOTROPIN (FSH): CPT | Performed by: INTERNAL MEDICINE

## 2022-11-21 PROCEDURE — 80061 LIPID PANEL: CPT | Performed by: INTERNAL MEDICINE

## 2022-11-21 PROCEDURE — 86803 HEPATITIS C AB TEST: CPT | Performed by: INTERNAL MEDICINE

## 2022-11-21 PROCEDURE — 85027 COMPLETE CBC AUTOMATED: CPT | Performed by: INTERNAL MEDICINE

## 2022-11-21 PROCEDURE — 80053 COMPREHEN METABOLIC PANEL: CPT | Performed by: INTERNAL MEDICINE

## 2022-11-22 ENCOUNTER — PATIENT MESSAGE (OUTPATIENT)
Dept: FAMILY MEDICINE | Facility: CLINIC | Age: 52
End: 2022-11-22
Payer: COMMERCIAL

## 2022-11-22 LAB
ERYTHROCYTE [DISTWIDTH] IN BLOOD BY AUTOMATED COUNT: 12.6 % (ref 11.5–14.5)
HCT VFR BLD AUTO: 40.7 % (ref 37–48.5)
HGB BLD-MCNC: 13.4 G/DL (ref 12–16)
MCH RBC QN AUTO: 28.8 PG (ref 27–31)
MCHC RBC AUTO-ENTMCNC: 32.9 G/DL (ref 32–36)
MCV RBC AUTO: 88 FL (ref 82–98)
PLATELET # BLD AUTO: 218 K/UL (ref 150–450)
PMV BLD AUTO: 11.1 FL (ref 9.2–12.9)
RBC # BLD AUTO: 4.65 M/UL (ref 4–5.4)
WBC # BLD AUTO: 4.5 K/UL (ref 3.9–12.7)

## 2022-11-29 ENCOUNTER — HOSPITAL ENCOUNTER (OUTPATIENT)
Dept: RADIOLOGY | Facility: HOSPITAL | Age: 52
Discharge: HOME | End: 2022-11-29
Attending: INTERNAL MEDICINE
Payer: COMMERCIAL

## 2022-11-29 DIAGNOSIS — Z13.820 ENCOUNTER FOR SCREENING FOR OSTEOPOROSIS: ICD-10-CM

## 2022-11-29 DIAGNOSIS — Z78.0 MENOPAUSE: ICD-10-CM

## 2022-11-29 PROCEDURE — 77080 DEXA BONE DENSITY SPINE HIP: ICD-10-PCS | Mod: 26,,, | Performed by: RADIOLOGY

## 2022-11-29 PROCEDURE — 77080 DXA BONE DENSITY AXIAL: CPT | Mod: TC,PO

## 2022-11-29 PROCEDURE — 77080 DXA BONE DENSITY AXIAL: CPT | Mod: 26,,, | Performed by: RADIOLOGY

## 2022-12-01 ENCOUNTER — OFFICE VISIT (OUTPATIENT)
Dept: OBSTETRICS AND GYNECOLOGY | Facility: CLINIC | Age: 52
End: 2022-12-01
Payer: COMMERCIAL

## 2022-12-01 VITALS
HEIGHT: 65 IN | WEIGHT: 148.13 LBS | BODY MASS INDEX: 24.68 KG/M2 | SYSTOLIC BLOOD PRESSURE: 118 MMHG | DIASTOLIC BLOOD PRESSURE: 72 MMHG

## 2022-12-01 DIAGNOSIS — Z01.419 WELL WOMAN EXAM: ICD-10-CM

## 2022-12-01 DIAGNOSIS — Z12.31 ENCOUNTER FOR SCREENING MAMMOGRAM FOR MALIGNANT NEOPLASM OF BREAST: Primary | ICD-10-CM

## 2022-12-01 PROCEDURE — 3074F SYST BP LT 130 MM HG: CPT | Mod: CPTII,S$GLB,, | Performed by: SPECIALIST

## 2022-12-01 PROCEDURE — 99999 PR PBB SHADOW E&M-EST. PATIENT-LVL III: CPT | Mod: PBBFAC,,, | Performed by: SPECIALIST

## 2022-12-01 PROCEDURE — 1159F MED LIST DOCD IN RCRD: CPT | Mod: CPTII,S$GLB,, | Performed by: SPECIALIST

## 2022-12-01 PROCEDURE — 3008F PR BODY MASS INDEX (BMI) DOCUMENTED: ICD-10-PCS | Mod: CPTII,S$GLB,, | Performed by: SPECIALIST

## 2022-12-01 PROCEDURE — 1159F PR MEDICATION LIST DOCUMENTED IN MEDICAL RECORD: ICD-10-PCS | Mod: CPTII,S$GLB,, | Performed by: SPECIALIST

## 2022-12-01 PROCEDURE — 3078F PR MOST RECENT DIASTOLIC BLOOD PRESSURE < 80 MM HG: ICD-10-PCS | Mod: CPTII,S$GLB,, | Performed by: SPECIALIST

## 2022-12-01 PROCEDURE — 3074F PR MOST RECENT SYSTOLIC BLOOD PRESSURE < 130 MM HG: ICD-10-PCS | Mod: CPTII,S$GLB,, | Performed by: SPECIALIST

## 2022-12-01 PROCEDURE — 99999 PR PBB SHADOW E&M-EST. PATIENT-LVL III: ICD-10-PCS | Mod: PBBFAC,,, | Performed by: SPECIALIST

## 2022-12-01 PROCEDURE — 3078F DIAST BP <80 MM HG: CPT | Mod: CPTII,S$GLB,, | Performed by: SPECIALIST

## 2022-12-01 PROCEDURE — 3008F BODY MASS INDEX DOCD: CPT | Mod: CPTII,S$GLB,, | Performed by: SPECIALIST

## 2022-12-01 PROCEDURE — 88175 CYTOPATH C/V AUTO FLUID REDO: CPT | Performed by: SPECIALIST

## 2022-12-01 PROCEDURE — 99396 PREV VISIT EST AGE 40-64: CPT | Mod: S$GLB,,, | Performed by: SPECIALIST

## 2022-12-01 PROCEDURE — 99396 PR PREVENTIVE VISIT,EST,40-64: ICD-10-PCS | Mod: S$GLB,,, | Performed by: SPECIALIST

## 2022-12-01 NOTE — PROGRESS NOTES
53 yo WF presents for annual gyn eval and HRT management Formerally on combination HRT but discontinued C/O episodic insomnia but denies VMFs, night sweats. Recent purposeful weight loss.  I discussed the risks and benefits of HRT and advise against resuming HRT  Past Medical History:   Diagnosis Date    Menopause 10/2017    age 46    PONV (postoperative nausea and vomiting)     Seizures     childhood - last age 5 (never placed on meds)    Shingles 12/2020    back       Past Surgical History:   Procedure Laterality Date    AUGMENTATION OF BREAST Bilateral 07/1998    BILATERAL TUBAL LIGATION  2009    BREAST CYST ASPIRATION Right     > 15 yrs ago    COLONOSCOPY N/A 08/17/2020    Procedure: COLONOSCOPY;  Surgeon: Yannick Min MD;  Location: [facility];  Service: Endoscopy;  Laterality: N/A;    COSMETIC SURGERY  Breast aug 1998, bleh 2004    ENDOMETRIAL ABLATION  2006    EYE SURGERY  2004    Blephroplasty    KNEE ARTHROSCOPY W/ MENISCECTOMY Right 03/17/2017    TONSILLECTOMY  1972 and 1985    TUBAL LIGATION  2007       Family History   Problem Relation Age of Onset    Breast cancer Mother 58    Diabetes Mother     Hypertension Mother     Hyperlipidemia Mother     Heart disease Mother         By Pass & Stents    Coronary artery disease Mother         stent and cabg (60's)    Miscarriages / Stillbirths Mother     Vision loss Mother         Cataracts    Diabetes Father     Hypertension Father     Hyperlipidemia Father     Heart disease Father         By Pass & Stents    Coronary artery disease Father         stent & cabg (age 60's)    Cancer Father         Prostrate    Vision loss Father         Cataracts    ADD / ADHD Daughter     Hypertension Maternal Aunt     Cancer Maternal Grandmother         Cancer of the chin/face (fatal)    Breast cancer Paternal Grandmother     Ovarian cancer Paternal Grandmother     Cancer Paternal Grandmother         Lung cancer    Cancer Paternal Grandfather         Prostrate, lung,  brain cancer    Hyperlipidemia Brother     Hypertension Brother     Learning disabilities Brother         Dyslexia    Hypertension Brother     Heart disease Brother     Hypertension Brother     ADD / ADHD Son        Social History     Socioeconomic History    Marital status:     Number of children: 2   Occupational History    Occupation: Health Care      Comment: [employer]   Tobacco Use    Smoking status: Former     Packs/day: 1.00     Years: 13.00     Pack years: 13.00     Types: Cigarettes     Start date: 10/27/1988     Quit date: 10/27/2001     Years since quittin.1    Smokeless tobacco: Never    Tobacco comments:     No tobacco products in more than 18 years   Substance and Sexual Activity    Alcohol use: Yes     Alcohol/week: 1.0 standard drink     Types: 1 Glasses of wine per week     Comment: Social drinker. Maybe once or twice a month.    Drug use: No    Sexual activity: Yes     Partners: Male     Birth control/protection: Post-menopausal   Social History Narrative    The patient does exercise regularly (jogs/walks, volleyball).      She is not satisfied with weight.    Rates diet as fair.    She does not drink at least 1/2 gallon water daily.    She drinks 1 coffee/tea/caffeine-containing soft drinks daily.    Total sleep time at night is 6-7 hours.    She works 60 hours per week.    She does wear seat belts.    Hobbies include none.       Current Outpatient Medications   Medication Sig Dispense Refill    biotin 1 mg Cap Take by mouth.      cyanocobalamin, vitamin B-12, (VITAMIN B-12) 50 mcg tablet Take 50 mcg by mouth once daily.      saw palmetto 500 MG capsule Take 500 mg by mouth once daily.      semaglutide (OZEMPIC) 0.25 mg or 0.5 mg(2 mg/1.5 mL) pen injector 0.5 mg. Compounded vial from [facility]      metFORMIN (GLUCOPHAGE-XR) 500 MG ER 24hr tablet Take 1,000 mg by mouth once daily.       No current facility-administered medications for this visit.       Review of patient's  allergies indicates:  No Known Allergies    Review of System:   General: no chills, fever, night sweats, weight gain or weight loss  Psychological: no depression or suicidal ideation  Breasts: no new or changing breast lumps, nipple discharge or masses.  Respiratory: no cough, shortness of breath, or wheezing  Cardiovascular: no chest pain or dyspnea on exertion  Gastrointestinal: no abdominal pain, change in bowel habits, or black or bloody stools  Genito-Urinary: no incontinence, urinary frequency/urgency or vulvar/vaginal symptoms, pelvic pain or abnormal vaginal bleeding.  Musculoskeletal: no gait disturbance or muscular weakness                                               General Appearance    A and O x 4, Cooperative, no distress   Breasts    Abdomen   Symmetrical, no masses, no discharge, skin changes , erythema or retraction. No adenopathy  Implants in place    Soft, non-tender, bowel sounds active all four quadrants,  no masses, no organomegaly    Genitourinary:   External rectal exam shows no thrombosed external hemorrhoids.   Pelvic exam was performed with patient supine.  No labial fusion.  There is no rash, lesion or injury on the right labia.  There is no rash, lesion or injury on the left labia.  No bleeding and no signs of injury around the vaginal introitus, urethra is without lesions and well supported. The cervix is visualized with no discharge, lesions or friability.  No vaginal discharge found.  No significant Cystocele, Enterocele or rectocele, and uterus well supported.  Bimanual exam:  The urethra is normal to palpation and there are no palpable vaginal wall masses.  Uterus is not deviated, not enlarged, not fixed, normal shape and not tender.  Cervix exhibits no motion tenderness.   Right adnexum displays no mass and no tenderness.  Left adnexum displays no mass and no tenderness.   Extremities: Extremities normal, atraumatic, no cyanosis or edema                     NOTE  NURSING PERSONAL  PRESENT FOR ENTIRE PHYSICAL EXAM     PAP submitted    BSE monthly  Daily calcium intake  RTO 2 years/prn

## 2022-12-08 LAB
FINAL PATHOLOGIC DIAGNOSIS: NORMAL
Lab: NORMAL

## 2023-09-11 ENCOUNTER — PATIENT MESSAGE (OUTPATIENT)
Dept: ADMINISTRATIVE | Facility: HOSPITAL | Age: 53
End: 2023-09-11
Payer: COMMERCIAL

## 2023-09-11 ENCOUNTER — PATIENT MESSAGE (OUTPATIENT)
Dept: FAMILY MEDICINE | Facility: CLINIC | Age: 53
End: 2023-09-11
Payer: COMMERCIAL

## 2023-09-11 DIAGNOSIS — Z00.00 WELLNESS EXAMINATION: Primary | ICD-10-CM

## 2023-09-11 NOTE — TELEPHONE ENCOUNTER
Yes sent in fasting labs.  Schedule those and get her a schedule appointment with me for follow-up

## 2023-09-18 ENCOUNTER — TELEPHONE (OUTPATIENT)
Dept: FAMILY MEDICINE | Facility: CLINIC | Age: 53
End: 2023-09-18
Payer: COMMERCIAL

## 2023-09-18 NOTE — TELEPHONE ENCOUNTER
----- Message from Matias Alicea sent at 9/18/2023  7:12 AM CDT -----  Contact: Pt.Portal  .Type:  Sooner Apoointment Request    Caller is requesting a sooner appointment.  Caller declined first available appointment listed below.  Caller will not accept being placed on the waitlist and is requesting a message be sent to doctor.  Name of Caller:pt  When is the first available appointment?11/01  Symptoms:   Would the patient rather a call back or a response via [facility]?  Call back  Best Call Back Number:[phone]  Additional Information: Pt. Would like to discuss her test results.        pT was here in November but has sinus pain on bridge  of nose to mouth.  Otc is not helping.  5 days.  [facility] gtz -[phone]  Rx?

## 2023-09-19 ENCOUNTER — LAB VISIT (OUTPATIENT)
Dept: LAB | Facility: HOSPITAL | Age: 53
End: 2023-09-19
Payer: COMMERCIAL

## 2023-09-19 DIAGNOSIS — Z00.00 WELLNESS EXAMINATION: ICD-10-CM

## 2023-09-19 LAB
ALBUMIN SERPL BCP-MCNC: 4.3 G/DL (ref 3.5–5.2)
ALP SERPL-CCNC: 36 U/L (ref 55–135)
ALT SERPL W/O P-5'-P-CCNC: 22 U/L (ref 10–44)
ANION GAP SERPL CALC-SCNC: 8 MMOL/L (ref 8–16)
AST SERPL-CCNC: 23 U/L (ref 10–40)
BILIRUB SERPL-MCNC: 0.5 MG/DL (ref 0.1–1)
BUN SERPL-MCNC: 14 MG/DL (ref 6–20)
CALCIUM SERPL-MCNC: 10.3 MG/DL (ref 8.7–10.5)
CHLORIDE SERPL-SCNC: 104 MMOL/L (ref 95–110)
CHOLEST SERPL-MCNC: 194 MG/DL (ref 120–199)
CHOLEST/HDLC SERPL: 3.5 {RATIO} (ref 2–5)
CO2 SERPL-SCNC: 28 MMOL/L (ref 23–29)
CREAT SERPL-MCNC: 0.7 MG/DL (ref 0.5–1.4)
ERYTHROCYTE [DISTWIDTH] IN BLOOD BY AUTOMATED COUNT: 13.1 % (ref 11.5–14.5)
EST. GFR  (NO RACE VARIABLE): >60 ML/MIN/1.73 M^2
GLUCOSE SERPL-MCNC: 83 MG/DL (ref 70–110)
HCT VFR BLD AUTO: 39.8 % (ref 37–48.5)
HDLC SERPL-MCNC: 56 MG/DL (ref 40–75)
HDLC SERPL: 28.9 % (ref 20–50)
HGB BLD-MCNC: 13.3 G/DL (ref 12–16)
LDLC SERPL CALC-MCNC: 122.2 MG/DL (ref 63–159)
MCH RBC QN AUTO: 29.5 PG (ref 27–31)
MCHC RBC AUTO-ENTMCNC: 33.4 G/DL (ref 32–36)
MCV RBC AUTO: 88 FL (ref 82–98)
NONHDLC SERPL-MCNC: 138 MG/DL
PLATELET # BLD AUTO: 220 K/UL (ref 150–450)
PMV BLD AUTO: 10.9 FL (ref 9.2–12.9)
POTASSIUM SERPL-SCNC: 4.5 MMOL/L (ref 3.5–5.1)
PROT SERPL-MCNC: 7.7 G/DL (ref 6–8.4)
RBC # BLD AUTO: 4.51 M/UL (ref 4–5.4)
SODIUM SERPL-SCNC: 140 MMOL/L (ref 136–145)
TRIGL SERPL-MCNC: 79 MG/DL (ref 30–150)
TSH SERPL DL<=0.005 MIU/L-ACNC: 0.81 UIU/ML (ref 0.4–4)
WBC # BLD AUTO: 5.52 K/UL (ref 3.9–12.7)

## 2023-09-19 PROCEDURE — 80061 LIPID PANEL: CPT | Performed by: INTERNAL MEDICINE

## 2023-09-19 PROCEDURE — 80053 COMPREHEN METABOLIC PANEL: CPT | Performed by: INTERNAL MEDICINE

## 2023-09-19 PROCEDURE — 85027 COMPLETE CBC AUTOMATED: CPT | Performed by: INTERNAL MEDICINE

## 2023-09-19 PROCEDURE — 36415 COLL VENOUS BLD VENIPUNCTURE: CPT | Mod: PN | Performed by: INTERNAL MEDICINE

## 2023-09-19 PROCEDURE — 84443 ASSAY THYROID STIM HORMONE: CPT | Performed by: INTERNAL MEDICINE

## 2023-10-10 ENCOUNTER — OFFICE VISIT (OUTPATIENT)
Dept: FAMILY MEDICINE | Facility: CLINIC | Age: 53
End: 2023-10-10
Payer: COMMERCIAL

## 2023-10-10 VITALS
WEIGHT: 121.13 LBS | HEART RATE: 74 BPM | OXYGEN SATURATION: 96 % | HEIGHT: 65 IN | SYSTOLIC BLOOD PRESSURE: 116 MMHG | BODY MASS INDEX: 20.18 KG/M2 | RESPIRATION RATE: 16 BRPM | DIASTOLIC BLOOD PRESSURE: 82 MMHG

## 2023-10-10 DIAGNOSIS — Z12.31 VISIT FOR SCREENING MAMMOGRAM: ICD-10-CM

## 2023-10-10 DIAGNOSIS — Z00.00 WELLNESS EXAMINATION: Primary | ICD-10-CM

## 2023-10-10 DIAGNOSIS — K57.90 DIVERTICULOSIS: ICD-10-CM

## 2023-10-10 PROCEDURE — 3008F BODY MASS INDEX DOCD: CPT | Mod: CPTII,S$GLB,, | Performed by: INTERNAL MEDICINE

## 2023-10-10 PROCEDURE — 99999 PR PBB SHADOW E&M-EST. PATIENT-LVL IV: CPT | Mod: PBBFAC,,, | Performed by: INTERNAL MEDICINE

## 2023-10-10 PROCEDURE — 1159F MED LIST DOCD IN RCRD: CPT | Mod: CPTII,S$GLB,, | Performed by: INTERNAL MEDICINE

## 2023-10-10 PROCEDURE — 3079F PR MOST RECENT DIASTOLIC BLOOD PRESSURE 80-89 MM HG: ICD-10-PCS | Mod: CPTII,S$GLB,, | Performed by: INTERNAL MEDICINE

## 2023-10-10 PROCEDURE — 1159F PR MEDICATION LIST DOCUMENTED IN MEDICAL RECORD: ICD-10-PCS | Mod: CPTII,S$GLB,, | Performed by: INTERNAL MEDICINE

## 2023-10-10 PROCEDURE — 3079F DIAST BP 80-89 MM HG: CPT | Mod: CPTII,S$GLB,, | Performed by: INTERNAL MEDICINE

## 2023-10-10 PROCEDURE — 90750 HZV VACC RECOMBINANT IM: CPT | Mod: S$GLB,,, | Performed by: INTERNAL MEDICINE

## 2023-10-10 PROCEDURE — 99396 PR PREVENTIVE VISIT,EST,40-64: ICD-10-PCS | Mod: 25,S$GLB,, | Performed by: INTERNAL MEDICINE

## 2023-10-10 PROCEDURE — 99396 PREV VISIT EST AGE 40-64: CPT | Mod: 25,S$GLB,, | Performed by: INTERNAL MEDICINE

## 2023-10-10 PROCEDURE — 1160F PR REVIEW ALL MEDS BY PRESCRIBER/CLIN PHARMACIST DOCUMENTED: ICD-10-PCS | Mod: CPTII,S$GLB,, | Performed by: INTERNAL MEDICINE

## 2023-10-10 PROCEDURE — 1160F RVW MEDS BY RX/DR IN RCRD: CPT | Mod: CPTII,S$GLB,, | Performed by: INTERNAL MEDICINE

## 2023-10-10 PROCEDURE — 99999 PR PBB SHADOW E&M-EST. PATIENT-LVL IV: ICD-10-PCS | Mod: PBBFAC,,, | Performed by: INTERNAL MEDICINE

## 2023-10-10 PROCEDURE — 3008F PR BODY MASS INDEX (BMI) DOCUMENTED: ICD-10-PCS | Mod: CPTII,S$GLB,, | Performed by: INTERNAL MEDICINE

## 2023-10-10 PROCEDURE — 3074F PR MOST RECENT SYSTOLIC BLOOD PRESSURE < 130 MM HG: ICD-10-PCS | Mod: CPTII,S$GLB,, | Performed by: INTERNAL MEDICINE

## 2023-10-10 PROCEDURE — 3074F SYST BP LT 130 MM HG: CPT | Mod: CPTII,S$GLB,, | Performed by: INTERNAL MEDICINE

## 2023-10-10 PROCEDURE — 90471 ZOSTER RECOMBINANT VACCINE: ICD-10-PCS | Mod: S$GLB,,, | Performed by: INTERNAL MEDICINE

## 2023-10-10 PROCEDURE — 90750 ZOSTER RECOMBINANT VACCINE: ICD-10-PCS | Mod: S$GLB,,, | Performed by: INTERNAL MEDICINE

## 2023-10-10 PROCEDURE — 90471 IMMUNIZATION ADMIN: CPT | Mod: S$GLB,,, | Performed by: INTERNAL MEDICINE

## 2023-10-10 NOTE — PROGRESS NOTES
Subjective:       Patient ID: Kelly Butterfield is a 53 y.o. female.    Chief Complaint: Annual Exam and Results (Review labs from )   HPI    Gyn: Dr Kc -- off HRT and pellets for 1yr   MM/22   Dexa:  osteopenia H -1.9 & -1.6   Colonoscopy:  2020 O Tic r/c 10 yr   Immunizations: Tdap:  Shingles: Y Covid: yes   Smoker:  former quit       [place]   Works for [employer] from home   [employer]      HLD: improved with weight loss. L 122      Metabolic syndrome: improved BMI 25 /152 down to 20/121. // Mgmt [facility] Rx generic Ozempic 30 units    Prev  Metformin, & Adipex issue sleeping         ____________________________________________________________________________________________________  Assessment & Plan:  1. Wellness examination    2. Diverticulosis    3. Visit for screening mammogram  - Mammo Digital Screening Bilat w/ Benito; Future     Wellness examination    Diverticulosis    Visit for screening mammogram  -     Mammo Digital Screening Bilat w/ Benito; Future; Expected date: 2023    Other orders  -     (In Office Administered) Zoster Recombinant Vaccine        Continue to work on regular exercise, maintain healthy weight, balanced diet. Avoid unhealthy habits: smoking, excessive alcohol intake.     Disclaimer: This note was partly generated using dictation software which may occasionally result in transcription errors  ____________________________________________________________________________________________________  Review of Systems:  Review of Systems   Constitutional:  Negative for appetite change.   HENT:  Negative for nosebleeds.    Eyes:  Negative for pain.   Respiratory:  Negative for choking.    Cardiovascular:  Negative for chest pain.   Gastrointestinal:  Negative for blood in stool.   Genitourinary:  Negative for hematuria.   Musculoskeletal:  Negative for joint swelling.   Skin:  Negative for pallor.   Neurological:  Negative for facial asymmetry.   Hematological:  Does not  "bruise/bleed easily.   Psychiatric/Behavioral:  Negative for confusion.        Objective:     Wt Readings from Last 3 Encounters:   10/10/23 54.9 kg (121 lb 2.3 oz)   12/01/22 67.2 kg (148 lb 2.4 oz)   11/09/22 69.2 kg (152 lb 8.9 oz)     BP Readings from Last 3 Encounters:   10/10/23 116/82   12/01/22 118/72   11/09/22 116/70       Lab Results   Component Value Date    WBC 5.52 09/19/2023    HGB 13.3 09/19/2023    HCT 39.8 09/19/2023     09/19/2023     09/19/2023    K 4.5 09/19/2023     09/19/2023    ALT 22 09/19/2023    AST 23 09/19/2023    CO2 28 09/19/2023    CREATININE 0.7 09/19/2023    BUN 14 09/19/2023    GLU 83 09/19/2023      No results found for: "HGBA1C"   Lab Results   Component Value Date    TSH 0.811 09/19/2023    TSH 0.621 11/21/2022    TSH 0.924 10/27/2017     No results found for: "FREET4"  Lab Results   Component Value Date    LDLCALC 122.2 09/19/2023    LDLCALC 143.6 11/21/2022    LDLCALC 137.4 10/27/2017     Lab Results   Component Value Date    TRIG 79 09/19/2023    TRIG 72 11/21/2022    TRIG 98 10/27/2017            Physical Exam  Constitutional:       Appearance: Normal appearance.   HENT:      Head: Normocephalic and atraumatic.   Eyes:      Extraocular Movements: Extraocular movements intact.      Pupils: Pupils are equal, round, and reactive to light.   Cardiovascular:      Rate and Rhythm: Normal rate.   Pulmonary:      Effort: Pulmonary effort is normal.   Neurological:      Mental Status: She is alert.         Medication List with Changes/Refills   Current Medications    BIOTIN 1 MG CAP    Take by mouth.    CYANOCOBALAMIN, VITAMIN B-12, (VITAMIN B-12) 50 MCG TABLET    Take 50 mcg by mouth once daily.    SAW PALMETTO 500 MG CAPSULE    Take 500 mg by mouth once daily.    SEMAGLUTIDE (OZEMPIC) 0.25 MG OR 0.5 MG(2 MG/1.5 ML) PEN INJECTOR    0.5 mg. Compounded vial from [facility]   Discontinued Medications    METFORMIN (GLUCOPHAGE-XR) 500 MG ER 24HR TABLET    Take 1,000 " mg by mouth once daily.

## 2023-11-15 ENCOUNTER — LAB VISIT (OUTPATIENT)
Dept: LAB | Facility: HOSPITAL | Age: 53
End: 2023-11-15
Attending: INTERNAL MEDICINE
Payer: COMMERCIAL

## 2023-11-15 ENCOUNTER — E-VISIT (OUTPATIENT)
Dept: FAMILY MEDICINE | Facility: CLINIC | Age: 53
End: 2023-11-15
Payer: COMMERCIAL

## 2023-11-15 DIAGNOSIS — R30.0 DYSURIA: ICD-10-CM

## 2023-11-15 DIAGNOSIS — R30.0 DYSURIA: Primary | ICD-10-CM

## 2023-11-15 DIAGNOSIS — N30.00 ACUTE CYSTITIS WITHOUT HEMATURIA: ICD-10-CM

## 2023-11-15 LAB
BACTERIA #/AREA URNS AUTO: NORMAL /HPF
BILIRUB UR QL STRIP: ABNORMAL
CAOX CRY UR QL COMP ASSIST: NORMAL
CLARITY UR REFRACT.AUTO: CLEAR
COLOR UR AUTO: YELLOW
GLUCOSE UR QL STRIP: NEGATIVE
HGB UR QL STRIP: NEGATIVE
KETONES UR QL STRIP: NEGATIVE
LEUKOCYTE ESTERASE UR QL STRIP: NEGATIVE
MICROSCOPIC COMMENT: NORMAL
NITRITE UR QL STRIP: POSITIVE
PH UR STRIP: 6 [PH] (ref 5–8)
PROT UR QL STRIP: NEGATIVE
RBC #/AREA URNS AUTO: 1 /HPF (ref 0–4)
SP GR UR STRIP: 1.03 (ref 1–1.03)
SQUAMOUS #/AREA URNS AUTO: 0 /HPF
URN SPEC COLLECT METH UR: ABNORMAL
WBC #/AREA URNS AUTO: 5 /HPF (ref 0–5)

## 2023-11-15 PROCEDURE — 99422 PR E&M, ONLINE DIGIT, EST, < 7 DAYS,  11-20 MINS: ICD-10-PCS | Mod: ,,, | Performed by: INTERNAL MEDICINE

## 2023-11-15 PROCEDURE — 81001 URINALYSIS AUTO W/SCOPE: CPT | Performed by: INTERNAL MEDICINE

## 2023-11-15 PROCEDURE — 99422 OL DIG E/M SVC 11-20 MIN: CPT | Mod: ,,, | Performed by: INTERNAL MEDICINE

## 2023-11-15 NOTE — PROGRESS NOTES
11/13  Patient ID: Kelly Butterfield is a 53 y.o. female.    Chief Complaint: Urinary Tract Infection (Entered automatically based on patient selection in Patient Portal.)    The patient initiated a request through Integration Management on 11/15/2023 for evaluation and management with a chief complaint of Urinary Tract Infection (Entered automatically based on patient selection in Patient Portal.)     I evaluated the questionnaire submission on 11/15/23     [facility] Peq Evisit Uti Questionnaire    11/15/2023 12:39 PM CST - Filed by Patient   Do you agree to participate in an E-Visit? Yes   If you have any of the following problems, please present to your local ER or call 911:  I acknowledge   What is the main issue that you would like for your doctor to address today? UTI   Are you able to take your vital signs? No   Are you currently pregnant, could you be pregnant, or are you breast feeding? None of the above   What symptoms do you currently have? Pain while passing urine;  Difficulty passing urine   When did your symptoms first appear? 11/13/2023   List what you have done or taken to help your symptoms. Started taking over the counter AZO   Please indicate whether you have had the following symptoms during the past 24 hours     Urgent urination (a sudden and uncontrollable urge to urinate) Mild   Frequent urination of small amounts of urine (going to the toilet very often) Mild   Burning pain when urinating Moderate   Incomplete bladder emptying (still feel like you need to urinate again after urination) Mild   Pain not associated with urination in the lower abdomen below the belly button) None   What does your urine look like? Clear   Blood seen in the urine None   Flank pain (pain in one or both sides of the lower back) None   Abnormal Vaginal Discharge (abnormal amount, color and/or odor) None   Discharge from the urethra (urinary opening) without urination None   High body temperature/fever? None-<99.5   Please rate how much  discomfort you have experience because of the symptoms in the past 24 hours: Mild   Please indicate how the symptoms have interfered with your every day activities/work in the past 24 hours: Mild   Please indicate how these symptoms have interfered with your social activities (visiting people, meeting with friends, etc.) in the past 24 hours? Mild   Are you a diabetic? No   Please indicate whether you have the following at the time of completion of this questionnaire: None of the above   Provide any information you feel is important to your history not asked above Color of urine is hard to assess becaose the azo makes it orange.   Please attach any relevant images or files (if you have performed a home test for UTI, please submit a photo of results)          Recent Labs Obtained:  Lab Visit on 11/15/2023   Component Date Value Ref Range Status    Specimen UA 11/15/2023 Urine, Clean Catch   Final    Color, UA 11/15/2023 Yellow  Yellow, Straw, Catherine Final    Appearance, UA 11/15/2023 Clear  Clear Final    pH, UA 11/15/2023 6.0  5.0 - 8.0 Final    Specific Gravity, UA 11/15/2023 1.030  1.005 - 1.030 Final    Protein, UA 11/15/2023 Negative  Negative Final    Comment: Recommend a 24 hour urine protein or a urine   protein/creatinine ratio if globulin induced proteinuria is  clinically suspected.      Glucose, UA 11/15/2023 Negative  Negative Final    Ketones, UA 11/15/2023 Negative  Negative Final    Bilirubin (UA) 11/15/2023 1+ (A)  Negative Final    Comment: Positive urine bilirubin is not confirmed. Correlate with   serum bilirubin and clinical presentation.      Occult Blood UA 11/15/2023 Negative  Negative Final    Nitrite, UA 11/15/2023 Positive (A)  Negative Final    Leukocytes, UA 11/15/2023 Negative  Negative Final    RBC, UA 11/15/2023 1  0 - 4 /hpf Final    WBC, UA 11/15/2023 5  0 - 5 /hpf Final    Bacteria 11/15/2023 Rare  None-Occ /hpf Final    Squam Epithel, UA 11/15/2023 0  /hpf Final    Ca Oxalate Tori,  UA 11/15/2023 Occasional  None-Moderate Final    Microscopic Comment 11/15/2023 SEE COMMENT   Final    Comment: Other formed elements not mentioned in the report are not   present in the microscopic examination.          Encounter Diagnoses   Name Primary?    Dysuria Yes    Acute cystitis without hematuria         Orders Placed This Encounter   Procedures    Urinalysis, Reflex to Urine Culture Urine, Clean Catch     Standing Status:   Future     Number of Occurrences:   1     Standing Expiration Date:   12/15/2023     Order Specific Question:   Preferred Collection Type     Answer:   Urine, Clean Catch     Order Specific Question:   Specimen Source     Answer:   Urine      Medications Ordered This Encounter   Medications    nitrofurantoin, macrocrystal-monohydrate, (MACROBID) 100 MG capsule     Sig: Take 1 capsule (100 mg total) by mouth 2 (two) times daily. for 5 days     Dispense:  10 capsule     Refill:  0    phenazopyridine (PYRIDIUM) 200 MG tablet     Sig: Take 1 tablet (200 mg total) by mouth 3 (three) times daily as needed for Pain.     Dispense:  6 tablet     Refill:  0        Follow up if symptoms worsen or fail to improve.      E-Visit Time Tracking:    Day 1 Time (in minutes): 5  Day 2 Time (in minutes): 7     Total Time (in minutes): 12

## 2023-11-16 RX ORDER — PHENAZOPYRIDINE HYDROCHLORIDE 200 MG/1
200 TABLET, FILM COATED ORAL 3 TIMES DAILY PRN
Qty: 6 TABLET | Refills: 0 | Status: SHIPPED | OUTPATIENT
Start: 2023-11-16 | End: 2023-11-18

## 2023-11-16 RX ORDER — NITROFURANTOIN 25; 75 MG/1; MG/1
100 CAPSULE ORAL 2 TIMES DAILY
Qty: 10 CAPSULE | Refills: 0 | Status: SHIPPED | OUTPATIENT
Start: 2023-11-16 | End: 2023-11-21

## 2023-12-04 ENCOUNTER — OFFICE VISIT (OUTPATIENT)
Dept: OBSTETRICS AND GYNECOLOGY | Facility: CLINIC | Age: 53
End: 2023-12-04
Payer: COMMERCIAL

## 2023-12-04 VITALS — WEIGHT: 121.5 LBS | BODY MASS INDEX: 20.21 KG/M2 | SYSTOLIC BLOOD PRESSURE: 110 MMHG | DIASTOLIC BLOOD PRESSURE: 77 MMHG

## 2023-12-04 DIAGNOSIS — Z12.4 PAP SMEAR FOR CERVICAL CANCER SCREENING: Primary | ICD-10-CM

## 2023-12-04 PROCEDURE — 3008F BODY MASS INDEX DOCD: CPT | Mod: CPTII,S$GLB,, | Performed by: SPECIALIST

## 2023-12-04 PROCEDURE — 3078F DIAST BP <80 MM HG: CPT | Mod: CPTII,S$GLB,, | Performed by: SPECIALIST

## 2023-12-04 PROCEDURE — 3074F SYST BP LT 130 MM HG: CPT | Mod: CPTII,S$GLB,, | Performed by: SPECIALIST

## 2023-12-04 PROCEDURE — 3074F PR MOST RECENT SYSTOLIC BLOOD PRESSURE < 130 MM HG: ICD-10-PCS | Mod: CPTII,S$GLB,, | Performed by: SPECIALIST

## 2023-12-04 PROCEDURE — 1159F PR MEDICATION LIST DOCUMENTED IN MEDICAL RECORD: ICD-10-PCS | Mod: CPTII,S$GLB,, | Performed by: SPECIALIST

## 2023-12-04 PROCEDURE — 3008F PR BODY MASS INDEX (BMI) DOCUMENTED: ICD-10-PCS | Mod: CPTII,S$GLB,, | Performed by: SPECIALIST

## 2023-12-04 PROCEDURE — 1159F MED LIST DOCD IN RCRD: CPT | Mod: CPTII,S$GLB,, | Performed by: SPECIALIST

## 2023-12-04 PROCEDURE — 3078F PR MOST RECENT DIASTOLIC BLOOD PRESSURE < 80 MM HG: ICD-10-PCS | Mod: CPTII,S$GLB,, | Performed by: SPECIALIST

## 2023-12-04 PROCEDURE — 99213 PR OFFICE/OUTPT VISIT, EST, LEVL III, 20-29 MIN: ICD-10-PCS | Mod: S$GLB,,, | Performed by: SPECIALIST

## 2023-12-04 PROCEDURE — 99999 PR PBB SHADOW E&M-EST. PATIENT-LVL III: CPT | Mod: PBBFAC,,, | Performed by: SPECIALIST

## 2023-12-04 PROCEDURE — 88175 CYTOPATH C/V AUTO FLUID REDO: CPT | Performed by: SPECIALIST

## 2023-12-04 PROCEDURE — 99213 OFFICE O/P EST LOW 20 MIN: CPT | Mod: S$GLB,,, | Performed by: SPECIALIST

## 2023-12-04 PROCEDURE — 99999 PR PBB SHADOW E&M-EST. PATIENT-LVL III: ICD-10-PCS | Mod: PBBFAC,,, | Performed by: SPECIALIST

## 2023-12-04 NOTE — PROGRESS NOTES
52 yo WF  presents for recommended pap screening and in addition HRT follow up  Pt admits to mild menopause s/s but does not desire to resume HRT as symptoms are mild  Past Medical History:   Diagnosis Date    Menopause 10/2017    age 46    PONV (postoperative nausea and vomiting)     Seizures     childhood - last age 5 (never placed on meds)    Shingles 2020    back       Past Surgical History:   Procedure Laterality Date    AUGMENTATION OF BREAST Bilateral 1998    BILATERAL TUBAL LIGATION  2009    BREAST CYST ASPIRATION Right     > 15 yrs ago    COLONOSCOPY N/A 2020    Procedure: COLONOSCOPY;  Surgeon: Yannick Min MD;  Location: [facility] ENDO;  Service: Endoscopy;  Laterality: N/A;    COSMETIC SURGERY  Breast aug 1998, bleh 2004    ENDOMETRIAL ABLATION  2006    EYE SURGERY  2004    Blephroplasty    KNEE ARTHROSCOPY W/ MENISCECTOMY Right 2017    TONSILLECTOMY  1972 and 1985    TUBAL LIGATION  2007       Family History   Problem Relation Age of Onset    Breast cancer Mother 58    Diabetes Mother     Hypertension Mother     Hyperlipidemia Mother     Heart disease Mother         By Pass & Stents    Coronary artery disease Mother         stent and cabg (60's)    Miscarriages / Stillbirths Mother     Vision loss Mother         Cataracts    Diabetes Father     Hypertension Father     Hyperlipidemia Father     Heart disease Father         By Pass & Stents    Coronary artery disease Father         stent & cabg (age 60's)    Cancer Father         Prostrate    Vision loss Father         Cataracts    ADD / ADHD Daughter     Hypertension Maternal Aunt     Cancer Maternal Grandmother         Cancer of the chin/face (fatal)    Breast cancer Paternal Grandmother     Ovarian cancer Paternal Grandmother     Cancer Paternal Grandmother         Lung cancer    Cancer Paternal Grandfather         Prostrate, lung, brain cancer    Hyperlipidemia Brother     Hypertension Brother     Learning disabilities  Brother         Dyslexia    Hypertension Brother     Heart disease Brother     Hypertension Brother     ADD / ADHD Son        Social History     Socioeconomic History    Marital status:     Number of children: 2   Occupational History    Occupation: Health Care      Comment: [employer]   Tobacco Use    Smoking status: Former     Current packs/day: 0.00     Average packs/day: 1 pack/day for 13.0 years (13.0 ttl pk-yrs)     Types: Cigarettes     Start date: 10/27/1988     Quit date: 10/27/2001     Years since quittin.1    Smokeless tobacco: Never    Tobacco comments:     No tobacco products in more than 18 years   Substance and Sexual Activity    Alcohol use: Yes     Alcohol/week: 1.0 standard drink of alcohol     Types: 1 Glasses of wine per week     Comment: Social drinker. Maybe once or twice a month.    Drug use: No    Sexual activity: Yes     Partners: Male     Birth control/protection: Post-menopausal   Social History Narrative    The patient does exercise regularly (runner)       international             Current Outpatient Medications   Medication Sig Dispense Refill    biotin 1 mg Cap Take by mouth.      cyanocobalamin, vitamin B-12, (VITAMIN B-12) 50 mcg tablet Take 50 mcg by mouth once daily.      saw palmetto 500 MG capsule Take 500 mg by mouth once daily.      semaglutide (OZEMPIC) 0.25 mg or 0.5 mg(2 mg/1.5 mL) pen injector 0.5 mg. Compounded vial from [facility]       No current facility-administered medications for this visit.       Review of patient's allergies indicates:  No Known Allergies    Review of System:   General: VMFs night sweats  Psychological: no depression or suicidal ideation  Breasts: no new or changing breast lumps, nipple discharge or masses.  Respiratory: no cough, shortness of breath, or wheezing  Cardiovascular: no chest pain or dyspnea on exertion  Gastrointestinal: no abdominal pain, change in bowel habits, or black or bloody  stools  Genito-Urinary: no incontinence, urinary frequency/urgency or vulvar/vaginal symptoms, pelvic pain or abnormal vaginal bleeding.  Musculoskeletal: no gait disturbance or muscular weakness                                               General Appearance    A and O x 4, Cooperative, no distress   Breasts    Abdomen   Symmetrical, no masses, no discharge, skin changes , erythema or retraction. No adenopathy  Implants in place  Soft, non-tender, bowel sounds active all four quadrants,  no masses, no organomegaly    Genitourinary:   External rectal exam shows no thrombosed external hemorrhoids.   Pelvic exam was performed with patient supine.  No labial fusion.  There is no rash, lesion or injury on the right labia.  There is no rash, lesion or injury on the left labia.  No bleeding and no signs of injury around the vaginal introitus, urethra is without lesions and well supported. The cervix is visualized with no discharge, lesions or friability.  No vaginal discharge found.  No significant Cystocele, Enterocele or rectocele, and uterus well supported.  Bimanual exam:  The urethra is normal to palpation and there are no palpable vaginal wall masses.  Uterus is not deviated, not enlarged, not fixed, normal shape and not tender.  Cervix exhibits no motion tenderness.   Right adnexum displays no mass and no tenderness.  Left adnexum displays no mass and no tenderness.   Extremities: Extremities normal, atraumatic, no cyanosis or edema                     NOTE  NURSING PERSONAL PRESENT FOR ENTIRE PHYSICAL EXAM     PAP submitted    Plna  BSE monthly  Screening mammogram up to date abnd reeat yearly  RTO 2 years/prn    I spent a total of 30 minutes on the day of the visit. This includes face to face time and non-face to face time preparing to see the patient (eg, review of tests), obtaining and/or reviewing separately obtained history, documenting clinical information in the electronic or other health record,  independently interpreting results and communicating results to the patient/family/caregiver, or care coordinator.

## 2023-12-12 LAB
FINAL PATHOLOGIC DIAGNOSIS: NORMAL
Lab: NORMAL

## 2024-11-22 ENCOUNTER — OFFICE VISIT (OUTPATIENT)
Dept: FAMILY MEDICINE | Facility: CLINIC | Age: 54
End: 2024-11-22
Payer: COMMERCIAL

## 2024-11-22 VITALS
BODY MASS INDEX: 19.74 KG/M2 | HEART RATE: 73 BPM | HEIGHT: 65 IN | DIASTOLIC BLOOD PRESSURE: 68 MMHG | SYSTOLIC BLOOD PRESSURE: 104 MMHG | OXYGEN SATURATION: 96 % | WEIGHT: 118.5 LBS

## 2024-11-22 DIAGNOSIS — M85.80 OSTEOPENIA, UNSPECIFIED LOCATION: ICD-10-CM

## 2024-11-22 DIAGNOSIS — Z12.31 BREAST CANCER SCREENING BY MAMMOGRAM: ICD-10-CM

## 2024-11-22 DIAGNOSIS — Z00.00 WELLNESS EXAMINATION: Primary | ICD-10-CM

## 2024-11-22 PROBLEM — Z12.11 SCREEN FOR COLON CANCER: Status: RESOLVED | Noted: 2020-08-17 | Resolved: 2024-11-22

## 2024-11-22 PROCEDURE — 99999 PR PBB SHADOW E&M-EST. PATIENT-LVL III: CPT | Mod: PBBFAC,,,

## 2024-11-22 NOTE — PROGRESS NOTES
Chief Complaint   Patient presents with     Port Draw     Port labs collected by RN. Port accessed with a 1in gripper needle.         [facility]  [address]  [place] [address]    Subjective    Chief Complaint:   Chief Complaint   Patient presents with    Annual Exam       History of Present Illness:     Kelly Butterfield is a(n) 54 y.o. female with past medical history as noted below who presents to the clinic today for routine visit.     Would like repeat a DEXA, previous DEXA showed osteopenia    Feels well today.  Runs for exercise.  She does report some difficulty sleeping but attributes this to her 's deviated septum/sleep apnea.  No anxiety or depression.  She is taking semaglutide 0.5 mg weekly for weight maintenance.  BMI is 19.72 kg per meter sq.       Problem List:   Patient Active Problem List   Diagnosis    Status post endometrial ablation    [place] cardiac risk <10% in next 10 years    Symptomatic menopausal or female climacteric states    Screen for colon cancer       Current Outpatient Medications:   Current Outpatient Medications   Medication Instructions    biotin 1 mg Cap Take by mouth.    cyanocobalamin (vitamin B-12) (VITAMIN B-12) 50 mcg, Oral, Daily    saw palmetto 500 mg, Oral, Daily    semaglutide (OZEMPIC) 0.5 mg       Surgical History:   Past Surgical History:   Procedure Laterality Date    AUGMENTATION OF BREAST Bilateral 07/1998    BILATERAL TUBAL LIGATION  2009    BREAST CYST ASPIRATION Right     > 15 yrs ago    COLONOSCOPY N/A 08/17/2020    Procedure: COLONOSCOPY;  Surgeon: Yannick Min MD;  Location: [facility];  Service: Endoscopy;  Laterality: N/A;    COSMETIC SURGERY  Breast aug 1998, bleh 2004    ENDOMETRIAL ABLATION  2006    EYE SURGERY  2004    Blephroplasty    KNEE ARTHROSCOPY W/ MENISCECTOMY Right 03/17/2017    TONSILLECTOMY  1972 and 1985    TUBAL LIGATION  2007       Family History:   Family History   Problem Relation Name Age of Onset    Breast cancer Mother Theresa Goodrich 58    Diabetes Mother Theresa Goodrich     Hypertension Mother Theresa  Kp     Hyperlipidemia Mother Theresa Moyer     Heart disease Mother Theresa Moyer         By Pass & Stents    Coronary artery disease Mother Theresa Moyer         stent and cabg (60's)    Miscarriages / Stillbirths Mother Theresa Moyer     Vision loss Mother Theresa Moyer         Cataracts    Diabetes Father Tim moyer     Hypertension Father Tim moyer     Hyperlipidemia Father Tim moyer     Heart disease Father Tim moyer         By Pass & Stents    Coronary artery disease Father Tim moyer         stent & cabg (age 60's)    Cancer Father Tim moyer         Prostrate    Vision loss Father Tim moyer         Cataracts    ADD / ADHD Daughter      Hypertension Maternal Aunt Kiya Cochran     Cancer Maternal Grandmother Anya Harmon         Cancer of the chin/face (fatal)    Breast cancer Paternal Grandmother Ruth Moyer     Ovarian cancer Paternal Grandmother Ruth Moyer     Cancer Paternal Grandmother Ruth Moyer         Lung cancer    Cancer Paternal Grandfather Agustin Moyer         Prostrate, lung, brain cancer    Hyperlipidemia Brother Marc Moyer     Hypertension Brother Marc Moyer     Learning disabilities Brother Marc Moyer         Dyslexia    Hypertension Brother      Heart disease Brother Radha Moyer     Hypertension Brother Radha Moyer     ADD / ADHD Son         Allergies: Review of patient's allergies indicates:  No Known Allergies    Tobacco Status:   Tobacco Use: Medium Risk (11/22/2024)    Patient History     Smoking Tobacco Use: Former     Smokeless Tobacco Use: Never     Passive Exposure: Not on file       Sexual Activity:   Social History     Substance and Sexual Activity   Sexual Activity Yes    Partners: Male    Birth control/protection: Post-menopausal       Alcohol Use:   Social History     Substance and Sexual Activity   Alcohol Use Yes    Alcohol/week: 1.0 standard drink of alcohol    Types: 1 Glasses of wine per week    Comment: Social drinker. Maybe once or twice a month.  "        Objective       Vitals:    11/22/24 0723   BP: 104/68   Pulse: 73   SpO2: 96%   Weight: 53.8 kg (118 lb 8 oz)   Height: 5' 5" (1.651 m)       Review of Systems   HENT:  Negative for hearing loss.    Eyes:  Negative for discharge.   Respiratory:  Negative for wheezing.    Cardiovascular:  Negative for chest pain and palpitations.   Gastrointestinal:  Negative for blood in stool, constipation, diarrhea and vomiting.   Genitourinary:  Negative for dysuria and hematuria.   Musculoskeletal:  Negative for neck pain.   Neurological:  Negative for weakness and headaches.   Endo/Heme/Allergies:  Negative for polydipsia.       Physical Exam  Constitutional:       General: She is not in acute distress.     Appearance: Normal appearance.   HENT:      Head: Normocephalic and atraumatic.   Cardiovascular:      Rate and Rhythm: Normal rate and regular rhythm.      Heart sounds: Normal heart sounds. No murmur heard.  Pulmonary:      Effort: Pulmonary effort is normal. No respiratory distress.      Breath sounds: Normal breath sounds. No wheezing.   Skin:     General: Skin is warm.   Neurological:      Mental Status: She is alert and oriented to person, place, and time.   Psychiatric:         Behavior: Behavior normal.           Assessment and Plan:    1. Wellness examination  -     CBC Without Differential; Future; Expected date: 11/22/2024  -     Comprehensive Metabolic Panel; Future; Expected date: 11/22/2024  -     Lipid Panel; Future; Expected date: 11/22/2024  -     TSH; Future; Expected date: 11/22/2024    2. Breast cancer screening by mammogram  -     Mammo Digital Screening Bilat w/ Benito; Future; Expected date: 11/22/2024    3. Osteopenia, unspecified location  -     DXA Bone Density Axial Skeleton 1 or more sites; Future; Expected date: 11/22/2024        Visit summary:    Kelly Butterfield presented today for routine visit.    Routine labs as above     Breast cancer screening, osteoporosis screening    Doing a great " job staying active.    Mild insomnia, recommend over-the-counter treatment with melatonin for now.  Let us know if this worsens    I do not see need for her to continue Ozempic, BMI in normal range.  Reports her weight has stayed stable for many months now    Follow up:  Annually PCP      Jami Tellez PA-C    This note was created partially with voice dictation software and is prone to errors. This note has been reviewed by me but some errors are inevitable.

## 2024-11-25 ENCOUNTER — LAB VISIT (OUTPATIENT)
Dept: LAB | Facility: HOSPITAL | Age: 54
End: 2024-11-25
Payer: COMMERCIAL

## 2024-11-25 DIAGNOSIS — Z00.00 WELLNESS EXAMINATION: ICD-10-CM

## 2024-11-25 LAB
ALBUMIN SERPL BCP-MCNC: 4 G/DL (ref 3.5–5.2)
ALP SERPL-CCNC: 33 U/L (ref 40–150)
ALT SERPL W/O P-5'-P-CCNC: 13 U/L (ref 10–44)
ANION GAP SERPL CALC-SCNC: 7 MMOL/L (ref 8–16)
AST SERPL-CCNC: 23 U/L (ref 10–40)
BILIRUB SERPL-MCNC: 0.6 MG/DL (ref 0.1–1)
BUN SERPL-MCNC: 14 MG/DL (ref 6–20)
CALCIUM SERPL-MCNC: 9.7 MG/DL (ref 8.7–10.5)
CHLORIDE SERPL-SCNC: 106 MMOL/L (ref 95–110)
CHOLEST SERPL-MCNC: 185 MG/DL (ref 120–199)
CHOLEST/HDLC SERPL: 3.6 {RATIO} (ref 2–5)
CO2 SERPL-SCNC: 27 MMOL/L (ref 23–29)
CREAT SERPL-MCNC: 0.8 MG/DL (ref 0.5–1.4)
ERYTHROCYTE [DISTWIDTH] IN BLOOD BY AUTOMATED COUNT: 12.6 % (ref 11.5–14.5)
EST. GFR  (NO RACE VARIABLE): >60 ML/MIN/1.73 M^2
GLUCOSE SERPL-MCNC: 87 MG/DL (ref 70–110)
HCT VFR BLD AUTO: 38.8 % (ref 37–48.5)
HDLC SERPL-MCNC: 51 MG/DL (ref 40–75)
HDLC SERPL: 27.6 % (ref 20–50)
HGB BLD-MCNC: 12.4 G/DL (ref 12–16)
LDLC SERPL CALC-MCNC: 120.6 MG/DL (ref 63–159)
MCH RBC QN AUTO: 29.1 PG (ref 27–31)
MCHC RBC AUTO-ENTMCNC: 32 G/DL (ref 32–36)
MCV RBC AUTO: 91 FL (ref 82–98)
NONHDLC SERPL-MCNC: 134 MG/DL
PLATELET # BLD AUTO: 197 K/UL (ref 150–450)
PMV BLD AUTO: 10.9 FL (ref 9.2–12.9)
POTASSIUM SERPL-SCNC: 4.1 MMOL/L (ref 3.5–5.1)
PROT SERPL-MCNC: 7.3 G/DL (ref 6–8.4)
RBC # BLD AUTO: 4.26 M/UL (ref 4–5.4)
SODIUM SERPL-SCNC: 140 MMOL/L (ref 136–145)
TRIGL SERPL-MCNC: 67 MG/DL (ref 30–150)
TSH SERPL DL<=0.005 MIU/L-ACNC: 0.45 UIU/ML (ref 0.4–4)
WBC # BLD AUTO: 4.56 K/UL (ref 3.9–12.7)

## 2024-11-25 PROCEDURE — 80053 COMPREHEN METABOLIC PANEL: CPT

## 2024-11-25 PROCEDURE — 36415 COLL VENOUS BLD VENIPUNCTURE: CPT | Mod: PN

## 2024-11-25 PROCEDURE — 80061 LIPID PANEL: CPT

## 2024-11-25 PROCEDURE — 85027 COMPLETE CBC AUTOMATED: CPT

## 2024-11-25 PROCEDURE — 84443 ASSAY THYROID STIM HORMONE: CPT

## 2024-12-06 ENCOUNTER — HOSPITAL ENCOUNTER (OUTPATIENT)
Dept: RADIOLOGY | Facility: HOSPITAL | Age: 54
Discharge: HOME | End: 2024-12-06
Payer: COMMERCIAL

## 2024-12-06 DIAGNOSIS — M85.80 OSTEOPENIA, UNSPECIFIED LOCATION: ICD-10-CM

## 2024-12-06 PROCEDURE — 77091 TBS TECHL CALCULATION ONLY: CPT | Mod: PO

## 2024-12-06 PROCEDURE — 77092 TBS I&R FX RSK QHP: CPT | Mod: ,,, | Performed by: RADIOLOGY

## 2024-12-06 PROCEDURE — 77080 DXA BONE DENSITY AXIAL: CPT | Mod: 26,,, | Performed by: RADIOLOGY

## 2024-12-09 ENCOUNTER — OFFICE VISIT (OUTPATIENT)
Dept: OBSTETRICS AND GYNECOLOGY | Facility: CLINIC | Age: 54
End: 2024-12-09
Payer: COMMERCIAL

## 2024-12-09 VITALS — SYSTOLIC BLOOD PRESSURE: 104 MMHG | DIASTOLIC BLOOD PRESSURE: 76 MMHG | WEIGHT: 121.5 LBS | BODY MASS INDEX: 20.21 KG/M2

## 2024-12-09 DIAGNOSIS — M81.0 AGE-RELATED OSTEOPOROSIS WITHOUT CURRENT PATHOLOGICAL FRACTURE: Primary | ICD-10-CM

## 2024-12-09 DIAGNOSIS — Z01.411 ENCOUNTER FOR GYNECOLOGICAL EXAMINATION WITH ABNORMAL FINDING: ICD-10-CM

## 2024-12-09 PROCEDURE — 99999 PR PBB SHADOW E&M-EST. PATIENT-LVL III: CPT | Mod: PBBFAC,,, | Performed by: SPECIALIST

## 2024-12-09 NOTE — PROGRESS NOTES
55 yo WF  presents for annual gyn eval  Screening mammogram up to date and reveiwed  Recent DEXA scan reveals progressive osteoporosis with decline in femoral neck and lumbar vert.  Pt is avid runner but postmenopausal and thin  Past Medical History:   Diagnosis Date    Menopause 10/2017    age 46    PONV (postoperative nausea and vomiting)     Seizures     childhood - last age 5 (never placed on meds)    Shingles 2020    back       Past Surgical History:   Procedure Laterality Date    AUGMENTATION OF BREAST Bilateral 1998    BILATERAL TUBAL LIGATION  2009    BREAST CYST ASPIRATION Right     > 15 yrs ago    COLONOSCOPY N/A 2020    Procedure: COLONOSCOPY;  Surgeon: Yannick Min MD;  [facility];  Service: Endoscopy;  Laterality: N/A;    COSMETIC SURGERY  Breast aug 1998, bleh 2004    ENDOMETRIAL ABLATION  2006    EYE SURGERY  2004    Blephroplasty    KNEE ARTHROSCOPY W/ MENISCECTOMY Right 2017    TONSILLECTOMY  1972 and 1985    TUBAL LIGATION  2007       Family History   Problem Relation Name Age of Onset    Breast cancer Mother Theresa Moyer 58    Diabetes Mother Theresa Moyer     Hypertension Mother Theresa Moyer     Hyperlipidemia Mother Theresa Moyer     Heart disease Mother Theresa Moyer         By Pass & Stents    Coronary artery disease Mother Theresa Moyer         stent and cabg (60's)    Miscarriages / Stillbirths Mother Theresa Moyer     Vision loss Mother Theresa Moyer         Cataracts    Diabetes Father Tim moyer     Hypertension Father Tim moyer     Hyperlipidemia Father Tim moyer     Heart disease Father Tim moyer         By Pass & Stents    Coronary artery disease Father Tim moyer         stent & cabg (age 60's)    Cancer Father Tim omyer         Prostrate    Vision loss Father Tim moyer         Cataracts    ADD / ADHD Daughter      Hypertension Maternal Aunt Kiya Laroseke     Cancer Maternal Grandmother Anya Faustino         Cancer of the  chin/face (fatal)    Breast cancer Paternal Grandmother Ruth Goodrich     Ovarian cancer Paternal Grandmother Ruth Goodrich     Cancer Paternal Grandmother Ruth Goodrich         Lung cancer    Cancer Paternal Grandfather Agustin Goodrich         Prostrate, lung, brain cancer    Hyperlipidemia Brother Marc Goodrich     Hypertension Brother Marc Goodrich     Learning disabilities Brother Marc Goodrich         Dyslexia    Hypertension Brother      Heart disease Brother Radha Goodrich     Hypertension Brother Radha Goodrich     ADD / ADHD Son         Social History     Socioeconomic History    Marital status:     Number of children: 2   Occupational History    Occupation: Health Care      Comment: [employer]   Tobacco Use    Smoking status: Former     Current packs/day: 0.00     Average packs/day: 1 pack/day for 13.0 years (13.0 ttl pk-yrs)     Types: Cigarettes     Start date: 10/27/1988     Quit date: 10/27/2001     Years since quittin.1    Smokeless tobacco: Never    Tobacco comments:     No tobacco products in more than 18 years   Substance and Sexual Activity    Alcohol use: Yes     Alcohol/week: 1.0 standard drink of alcohol     Types: 1 Glasses of wine per week     Comment: Social drinker. Maybe once or twice a month.    Drug use: No    Sexual activity: Yes     Partners: Male     Birth control/protection: Post-menopausal   Social History Narrative    The patient does exercise regularly (runner)       international           Social Drivers of Health     Financial Resource Strain: Low Risk  (2024)    Overall Financial Resource Strain (CARDIA)     Difficulty of Paying Living Expenses: Not very hard   Food Insecurity: No Food Insecurity (2024)    Hunger Vital Sign     Worried About Running Out of Food in the Last Year: Never true     Ran Out of Food in the Last Year: Never true   Physical Activity: Sufficiently Active (2024)    Exercise Vital Sign     Days of Exercise per Week: 5 days      Minutes of Exercise per Session: 60 min   Stress: No Stress Concern Present (11/21/2024)    [employer] - Occupational Stress Questionnaire     Feeling of Stress : Only a little   Housing Stability: Unknown (11/21/2024)    Housing Stability Vital Sign     Unable to Pay for Housing in the Last Year: No       Current Outpatient Medications   Medication Sig Dispense Refill    biotin 1 mg Cap Take by mouth.      semaglutide (OZEMPIC) 0.25 mg or 0.5 mg(2 mg/1.5 mL) pen injector 0.5 mg. Compounded vial from [facility]       No current facility-administered medications for this visit.       Review of patient's allergies indicates:  No Known Allergies    Review of System:   General: no chills, fever, night sweats, weight gain or weight loss  Psychological: no depression or suicidal ideation  Breasts: no new or changing breast lumps, nipple discharge or masses.  Respiratory: no cough, shortness of breath, or wheezing  Cardiovascular: no chest pain or dyspnea on exertion  Gastrointestinal: no abdominal pain, change in bowel habits, or black or bloody stools  Genito-Urinary: no incontinence, urinary frequency/urgency or vulvar/vaginal symptoms, pelvic pain or abnormal vaginal bleeding.  Musculoskeletal: no gait disturbance or muscular weakness                                               General Appearance    A and O x 4, Cooperative, no distress   Breasts    Abdomen   Symmetrical, no masses, no discharge, skin changes , erythema or retraction. No adenopathy  Implants in place  Soft, non-tender, bowel sounds active all four quadrants,  no masses, no organomegaly    Genitourinary:   External rectal exam shows no thrombosed external hemorrhoids.   Pelvic exam was performed with patient supine.  No labial fusion.  There is no rash, lesion or injury on the right labia.  There is no rash, lesion or injury on the left labia.  No bleeding and no signs of injury around the vaginal introitus, urethra is  without lesions and well supported. The cervix is visualized with no discharge, lesions or friability.  No vaginal discharge found.  No significant Cystocele, Enterocele or rectocele, and uterus well supported.  Bimanual exam:  The urethra is normal to palpation and there are no palpable vaginal wall masses.  Uterus is not deviated, not enlarged, not fixed, normal shape and not tender.  Cervix exhibits no motion tenderness.   Right adnexum displays no mass and no tenderness.  Left adnexum displays no mass and no tenderness.   Extremities: Extremities normal, atraumatic, no cyanosis or edema                     NOTE  NURSING PERSONAL PRESENT FOR ENTIRE PHYSICAL EXAM     I discussed risk factors for progression of bone loss  And rec daily calcium, weight bearing exercise  Discussed and feel pt a good Prolia candidate and will begin therapy plan  RTO 1 year/prn    I spent a total of 30 minutes on the day of the visit. This includes face to face time and non-face to face time preparing to see the patient (eg, review of tests), obtaining and/or reviewing separately obtained history, documenting clinical information in the electronic or other health record, independently interpreting results and communicating results to the patient/family/caregiver, or care coordinator.

## 2024-12-10 ENCOUNTER — PATIENT MESSAGE (OUTPATIENT)
Dept: FAMILY MEDICINE | Facility: CLINIC | Age: 54
End: 2024-12-10
Payer: COMMERCIAL

## 2024-12-16 ENCOUNTER — PATIENT MESSAGE (OUTPATIENT)
Dept: OBSTETRICS AND GYNECOLOGY | Facility: CLINIC | Age: 54
End: 2024-12-16
Payer: COMMERCIAL

## 2024-12-31 ENCOUNTER — OFFICE VISIT (OUTPATIENT)
Dept: FAMILY MEDICINE | Facility: CLINIC | Age: 54
End: 2024-12-31
Payer: COMMERCIAL

## 2024-12-31 VITALS
HEIGHT: 65 IN | DIASTOLIC BLOOD PRESSURE: 64 MMHG | RESPIRATION RATE: 18 BRPM | OXYGEN SATURATION: 98 % | BODY MASS INDEX: 20.66 KG/M2 | HEART RATE: 75 BPM | WEIGHT: 124 LBS | SYSTOLIC BLOOD PRESSURE: 122 MMHG

## 2024-12-31 DIAGNOSIS — M81.0 AGE-RELATED OSTEOPOROSIS WITHOUT CURRENT PATHOLOGICAL FRACTURE: Primary | ICD-10-CM

## 2024-12-31 DIAGNOSIS — Z78.0 MENOPAUSE: ICD-10-CM

## 2024-12-31 DIAGNOSIS — Z00.00 WELLNESS EXAMINATION: ICD-10-CM

## 2024-12-31 PROCEDURE — 99999 PR PBB SHADOW E&M-EST. PATIENT-LVL IV: CPT | Mod: PBBFAC,,, | Performed by: INTERNAL MEDICINE

## 2024-12-31 RX ORDER — HEPARIN 100 UNIT/ML
500 SYRINGE INTRAVENOUS
OUTPATIENT
Start: 2024-12-31

## 2024-12-31 RX ORDER — SODIUM CHLORIDE 0.9 % (FLUSH) 0.9 %
10 SYRINGE (ML) INJECTION
OUTPATIENT
Start: 2024-12-31

## 2024-12-31 RX ORDER — ZOLEDRONIC ACID 5 MG/100ML
5 INJECTION, SOLUTION INTRAVENOUS
OUTPATIENT
Start: 2024-12-31

## 2024-12-31 NOTE — PROGRESS NOTES
Subjective:       Patient ID: Kelly Butterfield is a 54 y.o. female.    Chief Complaint: Follow-up (Patient is here to go over lab results. 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  Gyn order Prolia.  Patient prefers Reclast infusion.  Very forgetful with taking medicines daily.  Has not had any fragility fractures.  Will attempt to get Reclast approved.     HLD: improved with weight loss. L 122      Metabolic syndrome: improved BMI 25 /152 down to 20/121-124.   Mgmt [facility] generic compounded GLP    Prev  Metformin, & Adipex issue sleeping        ____________________________________________________________________________________________________  Assessment & Plan:  1. Age-related osteoporosis without current pathological fracture  - Vitamin D; Future  - Basic Metabolic Panel; Future    2. Wellness examination     Age-related osteoporosis without current pathological fracture  -     Vitamin D; Future; Expected date: 2024  -     Basic Metabolic Panel; Future; Expected date: 2024    Wellness examination    Other orders  -     zoledronic acid-mannitol & water 5 mg/100 mL infusion 5 mg  -     heparin, porcine (PF) 100 unit/mL injection flush 500 Units  -     sodium chloride 0.9% flush 10 mL  -     0.9% NaCl 100 mL flush bag        Continue to work on maintain healthy weight, balanced diet. Avoid unhealthy habits: smoking/vaping, excessive alcohol intake.     Recommend diet exercise:  High protein, low fat, low carb diet - calories women under <1200 & men <1800, carbs <100 G, protein 50-60 G daily. Protein supplements to replace one meal.  Keep all beverages < 10 calories per serving. Keep  "snacks < 100 calories.   Recommend exercise 160 minutes per week, combo of cardio and weight/strength training.     Visit today included increased complexity associated with the care of the episodic problem addressed and managing the longitudinal care of the patient due to the serious and/or complex managed problem(s). Osteoporosis      Disclaimer: This note was partly generated using dictation software which may occasionally result in transcription errors  ____________________________________________________________________________________________________  Review of Systems:  Review of Systems      Negative     Objective:     Wt Readings from Last 3 Encounters:   12/31/24 56.3 kg (124 lb 0.1 oz)   12/09/24 55.1 kg (121 lb 7.6 oz)   11/22/24 53.8 kg (118 lb 8 oz)     BP Readings from Last 3 Encounters:   12/31/24 122/64   12/09/24 104/76   11/22/24 104/68       Lab Results   Component Value Date    WBC 4.56 11/25/2024    HGB 12.4 11/25/2024    HCT 38.8 11/25/2024     11/25/2024     11/25/2024    K 4.1 11/25/2024     11/25/2024    ALT 13 11/25/2024    AST 23 11/25/2024    CO2 27 11/25/2024    CREATININE 0.8 11/25/2024    BUN 14 11/25/2024    GLU 87 11/25/2024      No results found for: "HGBA1C"   Lab Results   Component Value Date    TSH 0.447 11/25/2024    TSH 0.811 09/19/2023    TSH 0.621 11/21/2022     No results found for: "FREET4"  Lab Results   Component Value Date    LDLCALC 120.6 11/25/2024    LDLCALC 122.2 09/19/2023    LDLCALC 143.6 11/21/2022     Lab Results   Component Value Date    TRIG 67 11/25/2024    TRIG 79 09/19/2023    TRIG 72 11/21/2022            Physical Exam      Constitutional:       Appearance: Normal appearance.   HENT:      Head: Normocephalic and atraumatic.   Eyes:      Extraocular Movements: Extraocular movements intact.      Pupils: Pupils are equal, round, and reactive to light.   Cardiovascular:      Rate and Rhythm: Normal rate.   Pulmonary:      Effort: Pulmonary " effort is normal.   Neurological:      Mental Status: She is alert and oriented to person, place, and time.   Psychiatric:         Mood and Affect: Mood normal.     Medication List with Changes/Refills   Current Medications    BIOTIN 1 MG CAP    Take by mouth.    SEMAGLUTIDE SUBQ    Inject into the skin once a week. Compounded  20 Units (timeless)   Discontinued Medications    SEMAGLUTIDE (OZEMPIC) 0.25 MG OR 0.5 MG(2 MG/1.5 ML) PEN INJECTOR    0.5 mg. Compounded vial from [facility]

## 2025-01-09 ENCOUNTER — INFUSION (OUTPATIENT)
Dept: INFUSION THERAPY | Facility: HOSPITAL | Age: 55
End: 2025-01-09
Attending: INTERNAL MEDICINE
Payer: COMMERCIAL

## 2025-01-09 VITALS
DIASTOLIC BLOOD PRESSURE: 70 MMHG | HEIGHT: 65 IN | HEART RATE: 72 BPM | RESPIRATION RATE: 18 BRPM | BODY MASS INDEX: 20.9 KG/M2 | WEIGHT: 125.44 LBS | OXYGEN SATURATION: 99 % | SYSTOLIC BLOOD PRESSURE: 105 MMHG | TEMPERATURE: 99 F

## 2025-01-09 DIAGNOSIS — M81.0 AGE-RELATED OSTEOPOROSIS WITHOUT CURRENT PATHOLOGICAL FRACTURE: Primary | ICD-10-CM

## 2025-01-09 PROCEDURE — 96374 THER/PROPH/DIAG INJ IV PUSH: CPT | Mod: PN

## 2025-01-09 PROCEDURE — 25000003 PHARM REV CODE 250: Mod: PN | Performed by: INTERNAL MEDICINE

## 2025-01-09 PROCEDURE — 63600175 PHARM REV CODE 636 W HCPCS: Mod: PN | Performed by: INTERNAL MEDICINE

## 2025-01-09 RX ORDER — SODIUM CHLORIDE 0.9 % (FLUSH) 0.9 %
10 SYRINGE (ML) INJECTION
OUTPATIENT
Start: 2025-01-09

## 2025-01-09 RX ORDER — HEPARIN 100 UNIT/ML
500 SYRINGE INTRAVENOUS
OUTPATIENT
Start: 2025-01-09

## 2025-01-09 RX ORDER — ZOLEDRONIC ACID 5 MG/100ML
5 INJECTION, SOLUTION INTRAVENOUS
OUTPATIENT
Start: 2025-01-09

## 2025-01-09 RX ORDER — ZOLEDRONIC ACID 5 MG/100ML
5 INJECTION, SOLUTION INTRAVENOUS
Status: COMPLETED | OUTPATIENT
Start: 2025-01-09 | End: 2025-01-09

## 2025-01-09 RX ADMIN — ZOLEDRONIC ACID 5 MG: 0.05 INJECTION, SOLUTION INTRAVENOUS at 02:01

## 2025-01-09 RX ADMIN — SODIUM CHLORIDE: 9 INJECTION, SOLUTION INTRAVENOUS at 02:01

## 2025-01-09 NOTE — PLAN OF CARE
Problem: Adult Inpatient Plan of Care  Goal: Plan of Care Review  Outcome: Progressing  Flowsheets (Taken 1/9/2025 1435)  Plan of Care Reviewed With: patient  Goal: Patient-Specific Goal (Individualized)  Outcome: Progressing  Flowsheets (Taken 1/9/2025 1435)  Individualized Care Needs: reviewed therapy plan, SE, supplements, fluid intake  Anxieties, Fears or Concerns: 1st infusion  Patient/Family-Specific Goals (Include Timeframe): no change in status     Problem: Fatigue  Goal: Improved Activity Tolerance  Outcome: Progressing  Intervention: Promote Improved Energy  Flowsheets (Taken 1/9/2025 1435)  Sleep/Rest Enhancement:   noise level reduced   natural light exposure provided  Activity Management:   Ambulated -L4   Up in chair - L3  Environmental Support: calm environment promoted     Pt tolerated relcast, VSS. Encouraged pt to increase fluid intake for the next few days. Pt reports no recent or upcoming dental work and taking supplements. NAD noted. Pt d/c home, left unit ambulatory.

## 2025-06-12 ENCOUNTER — PATIENT MESSAGE (OUTPATIENT)
Dept: OBSTETRICS AND GYNECOLOGY | Facility: CLINIC | Age: 55
End: 2025-06-12
Payer: COMMERCIAL

## 2025-06-12 DIAGNOSIS — N95.1 MENOPAUSAL SYMPTOMS: Primary | ICD-10-CM

## 2025-06-13 ENCOUNTER — LAB VISIT (OUTPATIENT)
Dept: LAB | Facility: HOSPITAL | Age: 55
End: 2025-06-13
Attending: INTERNAL MEDICINE
Payer: COMMERCIAL

## 2025-06-13 DIAGNOSIS — N95.1 MENOPAUSAL SYMPTOMS: ICD-10-CM

## 2025-06-13 DIAGNOSIS — M81.0 AGE-RELATED OSTEOPOROSIS WITHOUT CURRENT PATHOLOGICAL FRACTURE: ICD-10-CM

## 2025-06-13 LAB
25(OH)D3+25(OH)D2 SERPL-MCNC: 62 NG/ML (ref 30–96)
ANION GAP (OHS): 9 MMOL/L (ref 8–16)
BUN SERPL-MCNC: 13 MG/DL (ref 6–20)
CALCIUM SERPL-MCNC: 9.5 MG/DL (ref 8.7–10.5)
CHLORIDE SERPL-SCNC: 103 MMOL/L (ref 95–110)
CO2 SERPL-SCNC: 27 MMOL/L (ref 23–29)
CREAT SERPL-MCNC: 0.8 MG/DL (ref 0.5–1.4)
ESTRADIOL SERPL HS-MCNC: <10 PG/ML
FSH SERPL-ACNC: 107.61 MIU/ML
GFR SERPLBLD CREATININE-BSD FMLA CKD-EPI: >60 ML/MIN/1.73/M2
GLUCOSE SERPL-MCNC: 77 MG/DL (ref 70–110)
LH SERPL-ACNC: 25.6 MIU/ML
POTASSIUM SERPL-SCNC: 3.8 MMOL/L (ref 3.5–5.1)
SODIUM SERPL-SCNC: 139 MMOL/L (ref 136–145)
TESTOST SERPL-MCNC: 30 NG/DL (ref 5–73)
TSH SERPL-ACNC: 0.5 UIU/ML (ref 0.4–4)

## 2025-06-13 PROCEDURE — 83001 ASSAY OF GONADOTROPIN (FSH): CPT

## 2025-06-13 PROCEDURE — 82306 VITAMIN D 25 HYDROXY: CPT

## 2025-06-13 PROCEDURE — 84443 ASSAY THYROID STIM HORMONE: CPT

## 2025-06-13 PROCEDURE — 84402 ASSAY OF FREE TESTOSTERONE: CPT

## 2025-06-13 PROCEDURE — 36415 COLL VENOUS BLD VENIPUNCTURE: CPT | Mod: PN

## 2025-06-13 PROCEDURE — 83002 ASSAY OF GONADOTROPIN (LH): CPT

## 2025-06-13 PROCEDURE — 80048 BASIC METABOLIC PNL TOTAL CA: CPT

## 2025-06-13 PROCEDURE — 84403 ASSAY OF TOTAL TESTOSTERONE: CPT

## 2025-06-13 PROCEDURE — 82670 ASSAY OF TOTAL ESTRADIOL: CPT

## 2025-06-16 LAB — W FREE TESTOSTERONE: <0.4 PG/ML

## 2025-06-17 ENCOUNTER — RESULTS FOLLOW-UP (OUTPATIENT)
Dept: OBSTETRICS AND GYNECOLOGY | Facility: CLINIC | Age: 55
End: 2025-06-17

## 2025-06-19 ENCOUNTER — OFFICE VISIT (OUTPATIENT)
Dept: OBSTETRICS AND GYNECOLOGY | Facility: CLINIC | Age: 55
End: 2025-06-19
Payer: COMMERCIAL

## 2025-06-19 VITALS
SYSTOLIC BLOOD PRESSURE: 126 MMHG | WEIGHT: 121.69 LBS | BODY MASS INDEX: 20.25 KG/M2 | DIASTOLIC BLOOD PRESSURE: 74 MMHG

## 2025-06-19 DIAGNOSIS — N95.1 SYMPTOMATIC MENOPAUSAL OR FEMALE CLIMACTERIC STATES: Primary | ICD-10-CM

## 2025-06-19 PROCEDURE — 3008F BODY MASS INDEX DOCD: CPT | Mod: CPTII,S$GLB,, | Performed by: SPECIALIST

## 2025-06-19 PROCEDURE — 99213 OFFICE O/P EST LOW 20 MIN: CPT | Mod: S$GLB,,, | Performed by: SPECIALIST

## 2025-06-19 PROCEDURE — 99999 PR PBB SHADOW E&M-EST. PATIENT-LVL III: CPT | Mod: PBBFAC,,, | Performed by: SPECIALIST

## 2025-06-19 PROCEDURE — 1159F MED LIST DOCD IN RCRD: CPT | Mod: CPTII,S$GLB,, | Performed by: SPECIALIST

## 2025-06-19 PROCEDURE — 3074F SYST BP LT 130 MM HG: CPT | Mod: CPTII,S$GLB,, | Performed by: SPECIALIST

## 2025-06-19 PROCEDURE — 3078F DIAST BP <80 MM HG: CPT | Mod: CPTII,S$GLB,, | Performed by: SPECIALIST

## 2025-06-19 RX ORDER — NORETHINDRONE ACETATE AND ETHINYL ESTRADIOL .5; 2.5 MG/1; UG/1
1 TABLET ORAL DAILY
Qty: 90 TABLET | Refills: 3 | Status: SHIPPED | OUTPATIENT
Start: 2025-06-19 | End: 2026-06-19

## 2025-06-19 NOTE — PROGRESS NOTES
55 yo WF returns for menopause management Recent increase in complains or VFS insomnia fatigue  Menopause panel confirms hypoestrogen state with normal free testosterone  Past Medical History:   Diagnosis Date    Menopause 10/2017    age 46    PONV (postoperative nausea and vomiting)     Seizures     childhood - last age 5 (never placed on meds)    Shingles 12/2020    back       Past Surgical History:   Procedure Laterality Date    AUGMENTATION OF BREAST Bilateral 07/1998    BILATERAL TUBAL LIGATION  2009    BREAST CYST ASPIRATION Right     > 15 yrs ago    COLONOSCOPY N/A 08/17/2020    Procedure: COLONOSCOPY;  Surgeon: Yannick Min MD;  Location: [facility];  Service: Endoscopy;  Laterality: N/A;    COSMETIC SURGERY  Breast aug 1998, bleh 2004    ENDOMETRIAL ABLATION  2006    EYE SURGERY  2004    Blephroplasty    KNEE ARTHROSCOPY W/ MENISCECTOMY Right 03/17/2017    TONSILLECTOMY  1972 and 1985    TUBAL LIGATION  2007       Family History   Problem Relation Name Age of Onset    Breast cancer Mother Theresa Moyer 58    Diabetes Mother Theresa Moyer     Hypertension Mother Theresa Moyer     Hyperlipidemia Mother Theresa Moyer     Heart disease Mother Theresa Moyer         By Pass & Stents    Coronary artery disease Mother Theresa Moyer         stent and cabg (60's)    Miscarriages / Stillbirths Mother Theresa Moyer     Vision loss Mother Theresa Moyer         Cataracts    Diabetes Father Tim moyer     Hypertension Father Tim moyer     Hyperlipidemia Father Tim moyer     Heart disease Father Tim moyer         By Pass & Stents    Coronary artery disease Father Tim moyer         stent & cabg (age 60's)    Cancer Father Tim moyer         Prostrate    Vision loss Father Tim moyer         Cataracts    ADD / ADHD Daughter      Hypertension Maternal Aunt Kiya Laroseke     Cancer Maternal Grandmother Anya Faustino         Cancer of the chin/face (fatal)    Breast cancer Paternal Grandmother Ruth Moyer      Ovarian cancer Paternal Grandmother Ruth Goodrich     Cancer Paternal Grandmother Ruth Goodrich         Lung cancer    Cancer Paternal Grandfather Agustin Goodrich         Prostrate, lung, brain cancer    Hyperlipidemia Brother Marc Goodrich     Hypertension Brother Marc Goodrich     Learning disabilities Brother Marc Goodrich         Dyslexia    Hypertension Brother      Heart disease Brother Radha Goodrich     Hypertension Brother Radha Goodrich     ADD / ADHD Son         Social History     Socioeconomic History    Marital status:     Number of children: 2   Occupational History    Occupation: Health Care      Comment: [employer]   Tobacco Use    Smoking status: Former     Current packs/day: 0.00     Average packs/day: 1 pack/day for 13.0 years (13.0 ttl pk-yrs)     Types: Cigarettes     Start date: 10/27/1988     Quit date: 10/27/2001     Years since quittin.6    Smokeless tobacco: Never    Tobacco comments:     No tobacco products in more than 18 years   Substance and Sexual Activity    Alcohol use: Yes     Alcohol/week: 1.0 standard drink of alcohol     Types: 1 Glasses of wine per week     Comment: Social drinker. Maybe once or twice a month.    Drug use: No    Sexual activity: Yes     Partners: Male     Birth control/protection: Post-menopausal   Social History Narrative    The patient does exercise regularly (runner)       international           Social Drivers of Health     Financial Resource Strain: Low Risk  (2024)    Overall Financial Resource Strain (CARDIA)     Difficulty of Paying Living Expenses: Not very hard   Food Insecurity: No Food Insecurity (2024)    Hunger Vital Sign     Worried About Running Out of Food in the Last Year: Never true     Ran Out of Food in the Last Year: Never true   Physical Activity: Sufficiently Active (2024)    Exercise Vital Sign     Days of Exercise per Week: 5 days     Minutes of Exercise per Session: 60 min   Stress: No Stress Concern  Present (11/21/2024)    [facility] - Occupational Stress Questionnaire     Feeling of Stress : Only a little   Housing Stability: Unknown (11/21/2024)    Housing Stability Vital Sign     Unable to Pay for Housing in the Last Year: No       Current Medications[1]    Review of patient's allergies indicates:  No Known Allergies    Review of System:   General: as above  Psychological: no depression or suicidal ideation  Breasts: no new or changing breast lumps, nipple discharge or masses.  Respiratory: no cough, shortness of breath, or wheezing  Cardiovascular: no chest pain or dyspnea on exertion  Gastrointestinal: no abdominal pain, change in bowel habits, or black or bloody stools  Genito-Urinary: no incontinence, urinary frequency/urgency or vulvar/vaginal symptoms, pelvic pain or abnormal vaginal bleeding.  Musculoskeletal: no gait disturbance or muscular weakness    I discussed the above results and discussed the risks and benefits of combined HRT   Also discussed tx options including injectable, topical and oral routs  After discussion, pt desires HRT trial  Plan Will begin Fyavols .5/2.5 daily and follow response over 8 weeks  Answered all questions   Follow up 8 weeks    I spent a total of 30 minutes on the day of the visit. This includes face to face time and non-face to face time preparing to see the patient (eg, review of tests), obtaining and/or reviewing separately obtained history, documenting clinical information in the electronic or other health record, independently interpreting results and communicating results to the patient/family/caregiver, or care coordinator.          [1]   Current Outpatient Medications   Medication Sig Dispense Refill    SEMAGLUTIDE SUBQ Inject into the skin once a week. Compounded  20 Units (timeless)      biotin 1 mg Cap Take by mouth.      norethindrone ac-eth estradioL (FYAVOLV) 0.5-2.5 mg-mcg per tablet Take 1 tablet by mouth once daily. 90  tablet 3     No current facility-administered medications for this visit.

## 2025-06-25 ENCOUNTER — PATIENT MESSAGE (OUTPATIENT)
Dept: FAMILY MEDICINE | Facility: CLINIC | Age: 55
End: 2025-06-25
Payer: COMMERCIAL

## 2025-06-25 DIAGNOSIS — R04.0 NOSEBLEED: Primary | ICD-10-CM

## 2025-06-25 NOTE — TELEPHONE ENCOUNTER
Signed order for ENT eval  Since it was cauterized in the past that area of skin maybe thinning time.  Needs to be evaluated    She can see our ENT if opening soon//or SLENT

## 2025-07-01 NOTE — PROGRESS NOTES
Subjective     Patient ID: Kelly Butterfield is a 54 y.o. female.    Chief Complaint: No chief complaint on file.    HPI  Patient is new to ENT, referred by Dr. Haley for consultation for epistaxis. Epistaxis is left. Episodes have been occurring intermittently for 3 weeks.  Frequency:  4 episodes in 3 weeks.   RISK FACTORS:    Blood pressure:  Patient does not have a h/o HTN. Her BP runs low (she is a runner, training for a marathon).   Blood thinners:  none  Recent nasal spray use: none  Recent nasal trauma: none (s/p rhinoplasty)  Nasal dryness: yes  Nasal cleaning:  starts spontaneously with bending over or exertion    Review of Systems   Constitutional: Negative.    HENT: Negative.     Eyes: Negative.    Respiratory: Negative.     Cardiovascular: Negative.    Gastrointestinal: Negative.    Musculoskeletal: Negative.    Integumentary:  Negative.   Neurological: Negative.    Hematological: Negative.    Psychiatric/Behavioral: Negative.          Objective     Physical Exam  Vitals and nursing note reviewed.   Constitutional:       General: She is not in acute distress.     Appearance: She is well-developed. She is not ill-appearing.   HENT:      Head: Normocephalic and atraumatic.      Right Ear: Hearing, tympanic membrane, ear canal and external ear normal. No middle ear effusion. Tympanic membrane is not erythematous.      Left Ear: Hearing, tympanic membrane, ear canal and external ear normal.  No middle ear effusion. Tympanic membrane is not erythematous.      Nose: Nose normal.   Eyes:      General: Lids are normal. No scleral icterus.        Right eye: No discharge.         Left eye: No discharge.   Neck:      Trachea: Trachea normal. No tracheal deviation.   Cardiovascular:      Rate and Rhythm: Normal rate.   Pulmonary:      Effort: Pulmonary effort is normal. No respiratory distress.      Breath sounds: No stridor. No wheezing.   Musculoskeletal:         General: Normal range of motion.      Cervical  back: Normal range of motion and neck supple.   Skin:     General: Skin is warm and dry.   Neurological:      Mental Status: She is alert and oriented to person, place, and time.      Coordination: Coordination normal.      Gait: Gait normal.   Psychiatric:         Attention and Perception: Attention normal.         Mood and Affect: Mood normal.         Speech: Speech normal.         Behavior: Behavior normal. Behavior is cooperative.       SEPARATE PROCEDURE NOTE:    After verbal consent obtained, phenylephrine and xylocaine-soaked cotton indwelled in nasal cavity X 10-15 minutes. After removal, area inspected; superficial engorged capillary noted on left anterior nasal septum. AgNO3 applied to site without event. Pt tolerated well.        Assessment and Plan     1. Recurrent epistaxis  -     Ambulatory referral/consult to ENT      Tylenol prn pain.   Avoid blowing through or sneezing through cauterized side of nose for 4-5 days.   Handouts given and discussed on epistaxis and nasal humidification protocols. Daily use of saline drops or gel to prevent mucosal desiccation. Discussed Ponaris nasal emollient.   Return to clinic as needed for further episodes or any other ENT concerns.         No follow-ups on file.

## 2025-07-02 ENCOUNTER — OFFICE VISIT (OUTPATIENT)
Dept: OTOLARYNGOLOGY | Facility: CLINIC | Age: 55
End: 2025-07-02
Payer: COMMERCIAL

## 2025-07-02 VITALS — WEIGHT: 125 LBS | BODY MASS INDEX: 20.8 KG/M2

## 2025-07-02 DIAGNOSIS — R04.0 RECURRENT EPISTAXIS: ICD-10-CM

## 2025-07-02 PROCEDURE — 99999 PR PBB SHADOW E&M-EST. PATIENT-LVL III: CPT | Mod: PBBFAC,,, | Performed by: NURSE PRACTITIONER

## 2025-08-14 ENCOUNTER — OFFICE VISIT (OUTPATIENT)
Dept: OBSTETRICS AND GYNECOLOGY | Facility: CLINIC | Age: 55
End: 2025-08-14
Payer: COMMERCIAL

## 2025-08-14 VITALS
DIASTOLIC BLOOD PRESSURE: 72 MMHG | WEIGHT: 125.25 LBS | SYSTOLIC BLOOD PRESSURE: 120 MMHG | BODY MASS INDEX: 20.84 KG/M2

## 2025-08-14 DIAGNOSIS — N95.1 MENOPAUSAL SYMPTOMS: Primary | ICD-10-CM

## 2025-08-14 PROCEDURE — 99999 PR PBB SHADOW E&M-EST. PATIENT-LVL III: CPT | Mod: PBBFAC,,, | Performed by: SPECIALIST

## 2025-08-14 PROCEDURE — 3074F SYST BP LT 130 MM HG: CPT | Mod: CPTII,S$GLB,, | Performed by: SPECIALIST

## 2025-08-14 PROCEDURE — 99213 OFFICE O/P EST LOW 20 MIN: CPT | Mod: S$GLB,,, | Performed by: SPECIALIST

## 2025-08-14 PROCEDURE — 3008F BODY MASS INDEX DOCD: CPT | Mod: CPTII,S$GLB,, | Performed by: SPECIALIST

## 2025-08-14 PROCEDURE — 1159F MED LIST DOCD IN RCRD: CPT | Mod: CPTII,S$GLB,, | Performed by: SPECIALIST

## 2025-08-14 PROCEDURE — 3078F DIAST BP <80 MM HG: CPT | Mod: CPTII,S$GLB,, | Performed by: SPECIALIST

## 2025-08-15 ENCOUNTER — PATIENT MESSAGE (OUTPATIENT)
Dept: FAMILY MEDICINE | Facility: CLINIC | Age: 55
End: 2025-08-15
Payer: COMMERCIAL

## 2025-08-15 ENCOUNTER — PATIENT MESSAGE (OUTPATIENT)
Dept: OBSTETRICS AND GYNECOLOGY | Facility: CLINIC | Age: 55
End: 2025-08-15
Payer: COMMERCIAL

## 2025-08-15 RX ORDER — NORETHINDRONE ACETATE AND ETHINYL ESTRADIOL .5; 2.5 MG/1; UG/1
1 TABLET ORAL DAILY
Qty: 90 TABLET | Refills: 3 | Status: SHIPPED | OUTPATIENT
Start: 2025-08-15 | End: 2026-08-15